# Patient Record
Sex: MALE | Race: WHITE
[De-identification: names, ages, dates, MRNs, and addresses within clinical notes are randomized per-mention and may not be internally consistent; named-entity substitution may affect disease eponyms.]

---

## 2019-06-10 ENCOUNTER — HOSPITAL ENCOUNTER (OUTPATIENT)
Age: 39
Discharge: HOME | End: 2019-06-10
Payer: COMMERCIAL

## 2019-06-10 VITALS — BODY MASS INDEX: 44.9 KG/M2

## 2019-06-10 DIAGNOSIS — I10: Primary | ICD-10-CM

## 2019-06-10 LAB
ALANINE AMINOTRANSFER ALT/SGPT: 60 U/L (ref 16–61)
ALBUMIN SERPL-MCNC: 3.7 G/DL (ref 3.2–5)
ALKALINE PHOSPHATASE: 68 U/L (ref 45–117)
ANION GAP: 5 (ref 5–15)
AST(SGOT): 23 U/L (ref 15–37)
BUN SERPL-MCNC: 13 MG/DL (ref 7–18)
BUN/CREAT RATIO: 13.8 RATIO (ref 10–20)
CALCIUM SERPL-MCNC: 9.1 MG/DL (ref 8.5–10.1)
CARBON DIOXIDE: 29 MMOL/L (ref 21–32)
CHLORIDE: 103 MMOL/L (ref 98–107)
CHOLEST SERPL-MCNC: 167 MG/DL
DEPRECATED RDW RBC: 41.5 FL (ref 35.1–43.9)
DIFFERENTIAL INDICATED: (no result)
ERYTHROCYTE [DISTWIDTH] IN BLOOD: 13.8 % (ref 11.6–14.6)
EST GLOM FILT RATE - AFR AMER: 116 ML/MIN (ref 60–?)
GLOBULIN: 3.9 G/DL (ref 2.2–4.2)
GLUCOSE: 250 MG/DL (ref 74–106)
HCT VFR BLD AUTO: 40.5 % (ref 40–54)
HEMOGLOBIN: 14 G/DL (ref 13–16.5)
HGB BLD-MCNC: 14 G/DL (ref 13–16.5)
IMMATURE GRANULOCYTES COUNT: 0.02 X10^3/UL (ref 0–0)
MCV RBC: 82.7 FL (ref 80–94)
MEAN CORP HGB CONC: 34.6 G/GL (ref 32–36)
MEAN PLATELET VOL.: 10.8 FL (ref 6.2–12)
PLATELET # BLD: 222 K/MM3 (ref 150–450)
PLATELET COUNT: 222 K/MM3 (ref 150–450)
POSITIVE COUNT: NO
POSITIVE DIFFERENTIAL: NO
POSITIVE MORPHOLOGY: NO
POTASSIUM: 4 MMOL/L (ref 3.5–5.1)
RBC # BLD AUTO: 4.9 M/MM3 (ref 4.6–6.2)
RBC DISTRIBUTION WIDTH CV: 13.8 % (ref 11.6–14.6)
RBC DISTRIBUTION WIDTH SD: 41.5 FL (ref 35.1–43.9)
TRIGLYCERIDES: 247 MG/DL
VLDLC SERPL-MCNC: 49 MG/DL (ref 5–40)
WBC # BLD AUTO: 7.9 K/MM3 (ref 4.4–11)
WHITE BLOOD COUNT: 7.9 K/MM3 (ref 4.4–11)

## 2019-06-10 PROCEDURE — 80053 COMPREHEN METABOLIC PANEL: CPT

## 2019-06-10 PROCEDURE — 85025 COMPLETE CBC W/AUTO DIFF WBC: CPT

## 2019-06-10 PROCEDURE — 80061 LIPID PANEL: CPT

## 2020-06-29 ENCOUNTER — HOSPITAL ENCOUNTER (OUTPATIENT)
Age: 40
Discharge: HOME | End: 2020-06-29
Payer: COMMERCIAL

## 2020-06-29 VITALS — BODY MASS INDEX: 49.1 KG/M2

## 2020-06-29 DIAGNOSIS — E11.65: Primary | ICD-10-CM

## 2020-06-29 DIAGNOSIS — I10: ICD-10-CM

## 2020-06-29 LAB
ALANINE AMINOTRANSFER ALT/SGPT: 67 U/L (ref 16–61)
ALBUMIN SERPL-MCNC: 3.9 G/DL (ref 3.2–5)
ALKALINE PHOSPHATASE: 82 U/L (ref 45–117)
ANION GAP: 7 (ref 5–15)
AST(SGOT): 34 U/L (ref 15–37)
BUN SERPL-MCNC: 18 MG/DL (ref 7–18)
BUN/CREAT RATIO: 20.4 RATIO (ref 10–20)
CALCIUM SERPL-MCNC: 9 MG/DL (ref 8.5–10.1)
CARBON DIOXIDE: 28 MMOL/L (ref 21–32)
CHLORIDE: 105 MMOL/L (ref 98–107)
CHOLEST SERPL-MCNC: 204 MG/DL
DEPRECATED RDW RBC: 42.7 FL (ref 35.1–43.9)
ERYTHROCYTE [DISTWIDTH] IN BLOOD: 14.4 % (ref 11.6–14.6)
EST GLOM FILT RATE - AFR AMER: 124 ML/MIN (ref 60–?)
GLOBULIN: 4.3 G/DL (ref 2.2–4.2)
GLUCOSE: 126 MG/DL (ref 74–106)
HCT VFR BLD AUTO: 45.1 % (ref 40–54)
HEMOGLOBIN: 15 G/DL (ref 13–16.5)
HGB BLD-MCNC: 15 G/DL (ref 13–16.5)
IMMATURE GRANULOCYTES COUNT: 0.02 X10^3/UL (ref 0–0)
MCV RBC: 83.1 FL (ref 80–94)
MEAN CORP HGB CONC: 33.3 G/DL (ref 32–36)
MEAN PLATELET VOL.: 10.6 FL (ref 6.2–12)
NRBC FLAGGED BY ANALYZER: 0 % (ref 0–5)
PLATELET # BLD: 263 K/MM3 (ref 150–450)
PLATELET COUNT: 263 K/MM3 (ref 150–450)
POTASSIUM: 3.7 MMOL/L (ref 3.5–5.1)
RBC # BLD AUTO: 5.43 M/MM3 (ref 4.6–6.2)
RBC DISTRIBUTION WIDTH CV: 14.4 % (ref 11.6–14.6)
RBC DISTRIBUTION WIDTH SD: 42.7 FL (ref 35.1–43.9)
TRIGLYCERIDES: 263 MG/DL
VLDLC SERPL-MCNC: 53 MG/DL (ref 5–40)
WBC # BLD AUTO: 7.9 K/MM3 (ref 4.4–11)
WHITE BLOOD COUNT: 7.9 K/MM3 (ref 4.4–11)

## 2020-06-29 PROCEDURE — 85025 COMPLETE CBC W/AUTO DIFF WBC: CPT

## 2020-06-29 PROCEDURE — 80061 LIPID PANEL: CPT

## 2020-06-29 PROCEDURE — 80053 COMPREHEN METABOLIC PANEL: CPT

## 2020-11-12 ENCOUNTER — HOSPITAL ENCOUNTER (OUTPATIENT)
Dept: HOSPITAL 100 - LABSPEC | Age: 40
Discharge: HOME | End: 2020-11-12
Payer: COMMERCIAL

## 2020-11-12 DIAGNOSIS — U07.1: Primary | ICD-10-CM

## 2020-11-12 PROCEDURE — C9803 HOPD COVID-19 SPEC COLLECT: HCPCS

## 2020-11-12 PROCEDURE — 87635 SARS-COV-2 COVID-19 AMP PRB: CPT

## 2020-11-12 PROCEDURE — U0003 INFECTIOUS AGENT DETECTION BY NUCLEIC ACID (DNA OR RNA); SEVERE ACUTE RESPIRATORY SYNDROME CORONAVIRUS 2 (SARS-COV-2) (CORONAVIRUS DISEASE [COVID-19]), AMPLIFIED PROBE TECHNIQUE, MAKING USE OF HIGH THROUGHPUT TECHNOLOGIES AS DESCRIBED BY CMS-2020-01-R: HCPCS

## 2023-03-21 DIAGNOSIS — I10 HYPERTENSION, UNSPECIFIED TYPE: Primary | ICD-10-CM

## 2023-03-21 PROBLEM — E11.9 TYPE 2 DIABETES MELLITUS WITHOUT COMPLICATION (MULTI): Status: ACTIVE | Noted: 2023-03-21

## 2023-03-21 PROBLEM — G47.33 OBSTRUCTIVE SLEEP APNEA SYNDROME: Status: ACTIVE | Noted: 2023-03-21

## 2023-03-21 PROBLEM — F43.20 ADJUSTMENT DISORDER, UNSPECIFIED: Status: ACTIVE | Noted: 2023-03-21

## 2023-03-21 RX ORDER — DILTIAZEM HYDROCHLORIDE EXTENDED-RELEASE TABLETS 240 MG/1
240 TABLET, EXTENDED RELEASE ORAL DAILY
Qty: 30 TABLET | Refills: 2 | Status: SHIPPED | OUTPATIENT
Start: 2023-03-21 | End: 2023-03-23 | Stop reason: CLARIF

## 2023-03-21 RX ORDER — DILTIAZEM HYDROCHLORIDE 240 MG/1
240 CAPSULE, EXTENDED RELEASE ORAL DAILY
COMMUNITY
Start: 2023-02-14 | End: 2023-03-21

## 2023-03-21 RX ORDER — ALBUTEROL SULFATE 90 UG/1
AEROSOL, METERED RESPIRATORY (INHALATION)
COMMUNITY

## 2023-03-21 RX ORDER — METFORMIN HYDROCHLORIDE 1000 MG/1
1000 TABLET ORAL 2 TIMES DAILY
COMMUNITY
End: 2023-04-13 | Stop reason: SDUPTHER

## 2023-03-21 RX ORDER — DILTIAZEM HYDROCHLORIDE EXTENDED-RELEASE TABLETS 240 MG/1
240 TABLET, EXTENDED RELEASE ORAL DAILY
COMMUNITY
Start: 2022-01-06 | End: 2023-03-21 | Stop reason: ALTCHOICE

## 2023-03-21 RX ORDER — METOPROLOL TARTRATE AND HYDROCHLOROTHIAZIDE 100; 25 MG/1; MG/1
1 TABLET ORAL DAILY
Qty: 90 TABLET | Refills: 0 | Status: SHIPPED | OUTPATIENT
Start: 2023-03-21 | End: 2023-05-18 | Stop reason: SDUPTHER

## 2023-03-21 RX ORDER — DULAGLUTIDE 1.5 MG/.5ML
INJECTION, SOLUTION SUBCUTANEOUS
COMMUNITY
End: 2023-05-18

## 2023-03-21 RX ORDER — METOPROLOL TARTRATE AND HYDROCHLOROTHIAZIDE 100; 25 MG/1; MG/1
1 TABLET ORAL DAILY
COMMUNITY
End: 2023-03-21 | Stop reason: SDUPTHER

## 2023-03-21 RX ORDER — METOPROLOL SUCCINATE 50 MG/1
TABLET, EXTENDED RELEASE ORAL
COMMUNITY
Start: 2022-04-29 | End: 2023-05-18 | Stop reason: ALTCHOICE

## 2023-03-21 RX ORDER — DULOXETIN HYDROCHLORIDE 60 MG/1
60 CAPSULE, DELAYED RELEASE ORAL DAILY
COMMUNITY
End: 2023-03-27 | Stop reason: SDUPTHER

## 2023-03-22 DIAGNOSIS — I10 HYPERTENSION, UNSPECIFIED TYPE: Primary | ICD-10-CM

## 2023-03-22 DIAGNOSIS — F43.20 ADJUSTMENT DISORDER, UNSPECIFIED TYPE: ICD-10-CM

## 2023-03-23 RX ORDER — DILTIAZEM HYDROCHLORIDE 240 MG/1
240 CAPSULE, COATED, EXTENDED RELEASE ORAL DAILY
Qty: 90 CAPSULE | Refills: 3 | Status: SHIPPED | OUTPATIENT
Start: 2023-03-23 | End: 2023-05-18 | Stop reason: SDUPTHER

## 2023-03-23 NOTE — TELEPHONE ENCOUNTER
Reviewed denial -- tablet form  not covered. I changed this to Capsules.      CALL pt, let him know of the change.   If he still does not get it covered/ approved,  please ask him to let us know.

## 2023-03-23 NOTE — TELEPHONE ENCOUNTER
Reviewed denial tabs not covered. I changed this to Capsules.     CALL pt, let him know of the change.   If he still does not get it covered/ approved,  please ask him to let us know.

## 2023-03-23 NOTE — TELEPHONE ENCOUNTER
"Rx Refill Request Telephone Encounter    Name:  Ramirez Rodriguez  :  [id number]  Medication Name:  ***  {Dose (Optional):52589::\"***\"}  {Route (Optional):13794::\"***\"}  {Frequency (Optional):90482::\"***\"}  {Quantity (Optional):62934::\"***\"}  {Directions (Optional):92076::\"***\"}  Specific Pharmacy location:  ***  Date of last appointment:  ***  Date of next appointment:  ***  Best number to reach patient:  ***          Result Communication    No results found from the In Basket message.    7:26 AM      Results {WERE / WERE NOT:25241} successfully communicated with the {RHEUM PARENT/PATIENT:41157} and they {AMB Acknowledged/Did Not Acknowledge:49225} their understanding.    "

## 2023-03-27 RX ORDER — DULOXETIN HYDROCHLORIDE 60 MG/1
60 CAPSULE, DELAYED RELEASE ORAL DAILY
Qty: 90 CAPSULE | Refills: 1 | Status: SHIPPED | OUTPATIENT
Start: 2023-03-27 | End: 2023-05-18 | Stop reason: SDUPTHER

## 2023-04-07 DIAGNOSIS — M25.551 PAIN OF RIGHT HIP: ICD-10-CM

## 2023-04-07 RX ORDER — MELOXICAM 15 MG/1
15 TABLET ORAL DAILY
Qty: 30 TABLET | Refills: 1 | Status: SHIPPED | OUTPATIENT
Start: 2023-04-07 | End: 2023-04-07 | Stop reason: SDUPTHER

## 2023-04-07 RX ORDER — MELOXICAM 15 MG/1
15 TABLET ORAL DAILY
Qty: 30 TABLET | Refills: 1 | Status: SHIPPED | OUTPATIENT
Start: 2023-04-07 | End: 2023-05-18 | Stop reason: SDUPTHER

## 2023-04-07 RX ORDER — MELOXICAM 15 MG/1
15 TABLET ORAL DAILY
COMMUNITY
End: 2023-04-07 | Stop reason: SDUPTHER

## 2023-04-13 DIAGNOSIS — E11.9 TYPE 2 DIABETES MELLITUS WITHOUT COMPLICATION, WITHOUT LONG-TERM CURRENT USE OF INSULIN (MULTI): Primary | ICD-10-CM

## 2023-04-13 RX ORDER — METFORMIN HYDROCHLORIDE 1000 MG/1
1000 TABLET ORAL 2 TIMES DAILY
Qty: 60 TABLET | Refills: 0 | Status: SHIPPED | OUTPATIENT
Start: 2023-04-13 | End: 2023-05-18 | Stop reason: SDUPTHER

## 2023-05-12 LAB
CHOLESTEROL (MG/DL) IN SER/PLAS: 188 MG/DL (ref 0–199)
CHOLESTEROL IN HDL (MG/DL) IN SER/PLAS: 40.6 MG/DL
CHOLESTEROL/HDL RATIO: 4.6
ESTIMATED AVERAGE GLUCOSE FOR HBA1C: 174 MG/DL
HEMOGLOBIN A1C/HEMOGLOBIN TOTAL IN BLOOD: 7.7 %
LDL: 112 MG/DL (ref 0–99)
TRIGLYCERIDE (MG/DL) IN SER/PLAS: 179 MG/DL (ref 0–149)
VLDL: 36 MG/DL (ref 0–40)

## 2023-05-16 PROBLEM — S05.02XA LEFT CORNEAL ABRASION: Status: ACTIVE | Noted: 2023-05-16

## 2023-05-16 PROBLEM — M25.551 HIP PAIN, RIGHT: Status: ACTIVE | Noted: 2023-05-16

## 2023-05-16 PROBLEM — L60.0 INGROWN TOENAIL WITH INFECTION: Status: ACTIVE | Noted: 2023-05-16

## 2023-05-16 PROBLEM — R06.02 SHORTNESS OF BREATH ON EXERTION: Status: ACTIVE | Noted: 2023-05-16

## 2023-05-16 PROBLEM — H61.22 IMPACTED CERUMEN OF LEFT EAR: Status: ACTIVE | Noted: 2023-05-16

## 2023-05-16 PROBLEM — R05.9 COUGH IN ADULT: Status: ACTIVE | Noted: 2023-05-16

## 2023-05-17 PROBLEM — E78.5 HYPERLIPIDEMIA, MILD: Status: ACTIVE | Noted: 2023-05-17

## 2023-05-17 PROBLEM — H61.22 IMPACTED CERUMEN OF LEFT EAR: Status: RESOLVED | Noted: 2023-05-16 | Resolved: 2023-05-17

## 2023-05-17 PROBLEM — L60.0 INGROWN TOENAIL WITH INFECTION: Status: RESOLVED | Noted: 2023-05-16 | Resolved: 2023-05-17

## 2023-05-17 PROBLEM — E11.9 TYPE 2 DIABETES MELLITUS WITHOUT COMPLICATION (MULTI): Status: RESOLVED | Noted: 2023-03-21 | Resolved: 2023-05-17

## 2023-05-17 PROBLEM — S05.02XA LEFT CORNEAL ABRASION: Status: RESOLVED | Noted: 2023-05-16 | Resolved: 2023-05-17

## 2023-05-17 PROBLEM — E11.65 TYPE 2 DIABETES MELLITUS WITH HYPERGLYCEMIA (MULTI): Status: ACTIVE | Noted: 2023-05-17

## 2023-05-18 ENCOUNTER — TELEPHONE (OUTPATIENT)
Dept: PRIMARY CARE | Facility: CLINIC | Age: 43
End: 2023-05-18

## 2023-05-18 ENCOUNTER — OFFICE VISIT (OUTPATIENT)
Dept: PRIMARY CARE | Facility: CLINIC | Age: 43
End: 2023-05-18
Payer: COMMERCIAL

## 2023-05-18 VITALS
OXYGEN SATURATION: 97 % | DIASTOLIC BLOOD PRESSURE: 76 MMHG | WEIGHT: 315 LBS | TEMPERATURE: 98.6 F | HEART RATE: 78 BPM | HEIGHT: 74 IN | BODY MASS INDEX: 40.43 KG/M2 | SYSTOLIC BLOOD PRESSURE: 154 MMHG

## 2023-05-18 DIAGNOSIS — F43.20 ADJUSTMENT DISORDER, UNSPECIFIED TYPE: ICD-10-CM

## 2023-05-18 DIAGNOSIS — G47.33 OSA TREATED WITH BIPAP: ICD-10-CM

## 2023-05-18 DIAGNOSIS — I10 HYPERTENSION, UNSPECIFIED TYPE: ICD-10-CM

## 2023-05-18 DIAGNOSIS — E78.5 HYPERLIPIDEMIA, MILD: ICD-10-CM

## 2023-05-18 DIAGNOSIS — E66.01 CLASS 3 SEVERE OBESITY WITH SERIOUS COMORBIDITY AND BODY MASS INDEX (BMI) OF 50.0 TO 59.9 IN ADULT, UNSPECIFIED OBESITY TYPE (MULTI): ICD-10-CM

## 2023-05-18 DIAGNOSIS — E11.9 TYPE 2 DIABETES MELLITUS WITHOUT COMPLICATION, WITHOUT LONG-TERM CURRENT USE OF INSULIN (MULTI): ICD-10-CM

## 2023-05-18 DIAGNOSIS — M25.551 HIP PAIN, RIGHT: ICD-10-CM

## 2023-05-18 DIAGNOSIS — E11.65 TYPE 2 DIABETES MELLITUS WITH HYPERGLYCEMIA, WITHOUT LONG-TERM CURRENT USE OF INSULIN (MULTI): Primary | ICD-10-CM

## 2023-05-18 DIAGNOSIS — M25.551 PAIN OF RIGHT HIP: ICD-10-CM

## 2023-05-18 PROCEDURE — 99214 OFFICE O/P EST MOD 30 MIN: CPT | Performed by: FAMILY MEDICINE

## 2023-05-18 PROCEDURE — 3077F SYST BP >= 140 MM HG: CPT | Performed by: FAMILY MEDICINE

## 2023-05-18 PROCEDURE — 3078F DIAST BP <80 MM HG: CPT | Performed by: FAMILY MEDICINE

## 2023-05-18 PROCEDURE — 1036F TOBACCO NON-USER: CPT | Performed by: FAMILY MEDICINE

## 2023-05-18 PROCEDURE — 3008F BODY MASS INDEX DOCD: CPT | Performed by: FAMILY MEDICINE

## 2023-05-18 PROCEDURE — 3051F HG A1C>EQUAL 7.0%<8.0%: CPT | Performed by: FAMILY MEDICINE

## 2023-05-18 RX ORDER — METOPROLOL TARTRATE AND HYDROCHLOROTHIAZIDE 100; 25 MG/1; MG/1
1 TABLET ORAL DAILY
Qty: 90 TABLET | Refills: 0 | Status: SHIPPED | OUTPATIENT
Start: 2023-05-18 | End: 2023-10-26

## 2023-05-18 RX ORDER — METFORMIN HYDROCHLORIDE 1000 MG/1
1000 TABLET ORAL 2 TIMES DAILY
Qty: 60 TABLET | Refills: 0 | Status: SHIPPED | OUTPATIENT
Start: 2023-05-18 | End: 2023-06-30

## 2023-05-18 RX ORDER — DILTIAZEM HYDROCHLORIDE 240 MG/1
240 CAPSULE, COATED, EXTENDED RELEASE ORAL DAILY
Qty: 90 CAPSULE | Refills: 3 | Status: SHIPPED | OUTPATIENT
Start: 2023-05-18 | End: 2024-04-23

## 2023-05-18 RX ORDER — MELOXICAM 15 MG/1
15 TABLET ORAL DAILY
Qty: 30 TABLET | Refills: 1 | Status: SHIPPED | OUTPATIENT
Start: 2023-05-18 | End: 2023-09-20 | Stop reason: SDUPTHER

## 2023-05-18 RX ORDER — DULOXETIN HYDROCHLORIDE 60 MG/1
60 CAPSULE, DELAYED RELEASE ORAL DAILY
Qty: 90 CAPSULE | Refills: 1 | Status: SHIPPED | OUTPATIENT
Start: 2023-05-18 | End: 2024-05-21 | Stop reason: SDUPTHER

## 2023-05-18 NOTE — PROGRESS NOTES
" Subjective   Patient ID: Ramirez Rodriguez is a 42 y.o. male who presents for Follow-up.  HPI Follow Up Visit, past visit note reviewed.  Interval - takes/ tolerates Trulicity .  More adherent to all meds than in the past.  He is due for refills on all meds.     Patient Active Problem List   Diagnosis    Adjustment disorder, unspecified    Hypertension    LYNDSEY treated with BiPAP    Shortness of breath on exertion    Cough in adult    Hip pain, right    Type 2 diabetes mellitus with hyperglycemia (CMS/[facility])    Hyperlipidemia, mild         Review of Systems  Hip pain is controlled with once daily Meloxicam. No episodes of sob w exertion like he had in the past.    Objective   /76 (BP Location: Right arm, Patient Position: Sitting, BP Cuff Size: Large adult)   Pulse 78   Temp 37 °C (98.6 °F) (Temporal)   Ht 1.88 m (6' 2\")   Wt (!) 178 kg (391 lb 6.4 oz)   SpO2 97%   BMI 50.25 kg/m²     Physical Exam  Vitals reviewed.   Constitutional:       General: He is not in acute distress.  Cardiovascular:      Rate and Rhythm: Normal rate and regular rhythm.      Heart sounds: Normal heart sounds.   Pulmonary:      Effort: Pulmonary effort is normal.      Breath sounds: No wheezing or rales.   Neurological:      General: No focal deficit present.      Mental Status: He is alert.         Assessment/Plan           1. Hypertension, unspecified type  Continue current regimen.     - Referral to Comprehensive Weight Loss; Future  - metoprolol ta-hydrochlorothiaz (Lopressor HCT) 100-25 mg tablet; Take 1 tablet by mouth once daily.  Dispense: 90 tablet; Refill: 0  - dilTIAZem CD (Cardizem CD) 240 mg 24 hr capsule; Take 1 capsule (240 mg) by mouth once daily.  Dispense: 90 capsule; Refill: 3    2. Hip pain, right  Stable.  Monitor sxs.  If sxs worsen , low threshold to refer     3. Type 2 diabetes mellitus with hyperglycemia, without long-term current use of insulin (CMS/[facility])  Improved A1c  9 to 7 .    Increase Trulicity  and " recheck in 3 m    - dulaglutide 3 mg/0.5 mL pen injector; Inject 3 mg under the skin 1 (one) time per week.  Dispense: 2 mL; Refill: 3  - Referral to Comprehensive Weight Loss; Future    4. Hyperlipidemia, mild  Will tx with oral med if remains elevated at followup       5. LYNDSEY treated with BiPAP   Continue current regimen.          6. Class 3 severe obesity with serious comorbidity and body mass index (BMI) of 50.0 to 59.9 in adult, unspecified obesity type (CMS/[facility])  Referral to Wt Loss program   - Referral to Comprehensive Weight Loss; Future    7. Type 2 diabetes mellitus without complication, without long-term current use of insulin (CMS/[facility])  Med renewed   - metFORMIN (Glucophage) 1,000 mg tablet; Take 1 tablet (1,000 mg) by mouth 2 times a day.  Dispense: 60 tablet; Refill: 0       8 Adjustment disorder, unspecified type  Med renewed  - DULoxetine (Cymbalta) 60 mg DR capsule; Take 1 capsule (60 mg) by mouth once daily.  Dispense: 90 capsule; Refill: 1        DM2 improved .  Increase Trulicity ,  goal a1c  < 7.0    lipids unchanged. Recheck in 3 mo , and add statin if persists.   Referral for wt management; pt open to all options , including surgery .  Other meds renewed.   BP not at goal yet . Pt will continue to work on monitoring at home.    JESS Tarango MD

## 2023-05-18 NOTE — TELEPHONE ENCOUNTER
Fax sent on 5/18/23 @ 1052am to [phone]. Called [facility] wt management and let them know that the fax was sent, they will watch for fax and will call the pt once they receive.

## 2023-05-18 NOTE — TELEPHONE ENCOUNTER
Referral to weight loss program :      Please fax  to [facility] : [phone]  1.Paper order form pt completed and I completed  2. Electronic order printed from Epic  3. Today's visit note  CALL to initiate the process:   [phone] .   Let them know we are referring a new patient .     Thx

## 2023-06-29 DIAGNOSIS — E11.9 TYPE 2 DIABETES MELLITUS WITHOUT COMPLICATION, WITHOUT LONG-TERM CURRENT USE OF INSULIN (MULTI): ICD-10-CM

## 2023-06-30 RX ORDER — METFORMIN HYDROCHLORIDE 1000 MG/1
1000 TABLET ORAL 2 TIMES DAILY
Qty: 60 TABLET | Refills: 1 | Status: SHIPPED | OUTPATIENT
Start: 2023-06-30 | End: 2023-09-29 | Stop reason: SDUPTHER

## 2023-09-19 ENCOUNTER — LAB (OUTPATIENT)
Dept: LAB | Facility: LAB | Age: 43
End: 2023-09-19
Payer: COMMERCIAL

## 2023-09-19 DIAGNOSIS — E11.65 TYPE 2 DIABETES MELLITUS WITH HYPERGLYCEMIA, WITHOUT LONG-TERM CURRENT USE OF INSULIN (MULTI): ICD-10-CM

## 2023-09-19 DIAGNOSIS — E78.5 HYPERLIPIDEMIA, MILD: ICD-10-CM

## 2023-09-19 PROCEDURE — 80061 LIPID PANEL: CPT

## 2023-09-19 PROCEDURE — 36415 COLL VENOUS BLD VENIPUNCTURE: CPT

## 2023-09-19 PROCEDURE — 83036 HEMOGLOBIN GLYCOSYLATED A1C: CPT

## 2023-09-19 PROCEDURE — 80053 COMPREHEN METABOLIC PANEL: CPT

## 2023-09-20 ENCOUNTER — OFFICE VISIT (OUTPATIENT)
Dept: PRIMARY CARE | Facility: CLINIC | Age: 43
End: 2023-09-20
Payer: COMMERCIAL

## 2023-09-20 VITALS
BODY MASS INDEX: 49.32 KG/M2 | SYSTOLIC BLOOD PRESSURE: 149 MMHG | WEIGHT: 315 LBS | HEART RATE: 69 BPM | OXYGEN SATURATION: 95 % | DIASTOLIC BLOOD PRESSURE: 81 MMHG | TEMPERATURE: 98.4 F | RESPIRATION RATE: 14 BRPM

## 2023-09-20 DIAGNOSIS — I10 HYPERTENSION, UNSPECIFIED TYPE: ICD-10-CM

## 2023-09-20 DIAGNOSIS — E11.65 TYPE 2 DIABETES MELLITUS WITH HYPERGLYCEMIA, WITHOUT LONG-TERM CURRENT USE OF INSULIN (MULTI): Primary | ICD-10-CM

## 2023-09-20 DIAGNOSIS — M25.551 PAIN OF RIGHT HIP: ICD-10-CM

## 2023-09-20 LAB
ALANINE AMINOTRANSFERASE (SGPT) (U/L) IN SER/PLAS: 32 U/L (ref 10–52)
ALBUMIN (G/DL) IN SER/PLAS: 4.1 G/DL (ref 3.4–5)
ALKALINE PHOSPHATASE (U/L) IN SER/PLAS: 52 U/L (ref 33–120)
ANION GAP IN SER/PLAS: 13 MMOL/L (ref 10–20)
ASPARTATE AMINOTRANSFERASE (SGOT) (U/L) IN SER/PLAS: 20 U/L (ref 9–39)
BILIRUBIN TOTAL (MG/DL) IN SER/PLAS: 0.5 MG/DL (ref 0–1.2)
CALCIUM (MG/DL) IN SER/PLAS: 9.9 MG/DL (ref 8.6–10.6)
CARBON DIOXIDE, TOTAL (MMOL/L) IN SER/PLAS: 32 MMOL/L (ref 21–32)
CHLORIDE (MMOL/L) IN SER/PLAS: 99 MMOL/L (ref 98–107)
CHOLESTEROL (MG/DL) IN SER/PLAS: 195 MG/DL (ref 0–199)
CHOLESTEROL IN HDL (MG/DL) IN SER/PLAS: 39.3 MG/DL
CHOLESTEROL/HDL RATIO: 5
CREATININE (MG/DL) IN SER/PLAS: 0.8 MG/DL (ref 0.5–1.3)
ESTIMATED AVERAGE GLUCOSE FOR HBA1C: 194 MG/DL
GFR MALE: >90 ML/MIN/1.73M2
GLUCOSE (MG/DL) IN SER/PLAS: 217 MG/DL (ref 74–99)
HEMOGLOBIN A1C/HEMOGLOBIN TOTAL IN BLOOD: 8.4 %
LDL: 122 MG/DL (ref 0–99)
POTASSIUM (MMOL/L) IN SER/PLAS: 4.7 MMOL/L (ref 3.5–5.3)
PROTEIN TOTAL: 7.3 G/DL (ref 6.4–8.2)
SODIUM (MMOL/L) IN SER/PLAS: 139 MMOL/L (ref 136–145)
TRIGLYCERIDE (MG/DL) IN SER/PLAS: 167 MG/DL (ref 0–149)
UREA NITROGEN (MG/DL) IN SER/PLAS: 11 MG/DL (ref 6–23)
VLDL: 33 MG/DL (ref 0–40)

## 2023-09-20 PROCEDURE — 99214 OFFICE O/P EST MOD 30 MIN: CPT | Performed by: FAMILY MEDICINE

## 2023-09-20 PROCEDURE — 4010F ACE/ARB THERAPY RXD/TAKEN: CPT | Performed by: FAMILY MEDICINE

## 2023-09-20 PROCEDURE — 1036F TOBACCO NON-USER: CPT | Performed by: FAMILY MEDICINE

## 2023-09-20 PROCEDURE — 3008F BODY MASS INDEX DOCD: CPT | Performed by: FAMILY MEDICINE

## 2023-09-20 PROCEDURE — 3079F DIAST BP 80-89 MM HG: CPT | Performed by: FAMILY MEDICINE

## 2023-09-20 PROCEDURE — 3077F SYST BP >= 140 MM HG: CPT | Performed by: FAMILY MEDICINE

## 2023-09-20 PROCEDURE — 3052F HG A1C>EQUAL 8.0%<EQUAL 9.0%: CPT | Performed by: FAMILY MEDICINE

## 2023-09-20 RX ORDER — LISINOPRIL 20 MG/1
20 TABLET ORAL DAILY
Qty: 30 TABLET | Refills: 5 | Status: SHIPPED | OUTPATIENT
Start: 2023-09-20 | End: 2024-03-18

## 2023-09-20 RX ORDER — GLIPIZIDE 5 MG/1
5 TABLET ORAL
Qty: 60 TABLET | Refills: 5 | Status: SHIPPED | OUTPATIENT
Start: 2023-09-20 | End: 2024-03-18

## 2023-09-20 RX ORDER — MELOXICAM 15 MG/1
15 TABLET ORAL DAILY
Qty: 30 TABLET | Refills: 1 | Status: SHIPPED | OUTPATIENT
Start: 2023-09-20 | End: 2023-12-20 | Stop reason: SDUPTHER

## 2023-09-20 ASSESSMENT — PATIENT HEALTH QUESTIONNAIRE - PHQ9
1. LITTLE INTEREST OR PLEASURE IN DOING THINGS: NOT AT ALL
SUM OF ALL RESPONSES TO PHQ9 QUESTIONS 1 AND 2: 0
2. FEELING DOWN, DEPRESSED OR HOPELESS: NOT AT ALL

## 2023-09-20 NOTE — PROGRESS NOTES
Subjective   Patient ID: Ramirez Rodriguez is a 42 y.o. male who presents for Follow-up (3 mo fuv), Immunizations (Pt declines flu vaccine), Diabetes, Hyperlipidemia, and Hypertension.  HPI  Last visit  5/2023 .  At that time, increased Trulicity .  Tolerating change in dosage.  Adherent to other treatment as well.    Has an upcoming appointment in the [facility] weight loss management program.      Review of Systems  Denies chest pain, shortness of breath.  Objective   /81 (BP Location: Left arm, Patient Position: Sitting, BP Cuff Size: Large adult)   Pulse 69   Temp 36.9 °C (98.4 °F)   Resp 14   Wt (!) 174 kg (384 lb 1.6 oz)   SpO2 95%   BMI 49.32 kg/m²     Physical Exam  Vitals reviewed.   Constitutional:       General: He is not in acute distress.  Cardiovascular:      Rate and Rhythm: Normal rate and regular rhythm.      Heart sounds: Normal heart sounds.   Pulmonary:      Effort: Pulmonary effort is normal.      Breath sounds: No wheezing or rales.   Neurological:      General: No focal deficit present.      Mental Status: He is alert.         Wt Readings from Last 3 Encounters:   09/20/23 (!) 174 kg (384 lb 1.6 oz)   05/18/23 (!) 178 kg (391 lb 6.4 oz)   02/17/23 (!) 173 kg (382 lb 6 oz)     BP Readings from Last 3 Encounters:   09/20/23 149/81   05/18/23 154/76   02/17/23 150/84          Assessment/Plan   Problem List Items Addressed This Visit          Medium    Hypertension    Relevant Orders    Lipid Panel    Comprehensive Metabolic Panel    Type 2 diabetes mellitus with hyperglycemia (CMS/[facility]) - Primary    Relevant Medications    lisinopril 20 mg tablet    glipiZIDE (Glucotrol) 5 mg tablet    Other Relevant Orders    Hemoglobin A1C    Lipid Panel    Comprehensive Metabolic Panel     Other Visit Diagnoses       Pain of right hip        Relevant Medications    meloxicam (Mobic) 15 mg tablet            Diabetes type 2 with hyperglycemia: Add Glucotrol continue metformin and Trulicity.  Referral to  endocrinology if not improving  Hypertension uncontrolled: Add lisinopril 20 mg    Hip pain: Mobic renewed  3month followup w labwork.   JESS Tarango MD

## 2023-09-29 DIAGNOSIS — E11.9 TYPE 2 DIABETES MELLITUS WITHOUT COMPLICATION, WITHOUT LONG-TERM CURRENT USE OF INSULIN (MULTI): ICD-10-CM

## 2023-09-29 RX ORDER — METFORMIN HYDROCHLORIDE 1000 MG/1
1000 TABLET ORAL 2 TIMES DAILY
Qty: 60 TABLET | Refills: 2 | Status: SHIPPED | OUTPATIENT
Start: 2023-09-29 | End: 2023-12-20 | Stop reason: SDUPTHER

## 2023-10-26 DIAGNOSIS — I10 HYPERTENSION, UNSPECIFIED TYPE: ICD-10-CM

## 2023-10-26 DIAGNOSIS — E11.65 TYPE 2 DIABETES MELLITUS WITH HYPERGLYCEMIA, WITHOUT LONG-TERM CURRENT USE OF INSULIN (MULTI): ICD-10-CM

## 2023-10-26 RX ORDER — DULAGLUTIDE 3 MG/.5ML
3 INJECTION, SOLUTION SUBCUTANEOUS
Qty: 2 ML | Refills: 1 | Status: SHIPPED | OUTPATIENT
Start: 2023-10-26 | End: 2023-12-20 | Stop reason: SDUPTHER

## 2023-10-26 RX ORDER — METOPROLOL TARTRATE AND HYDROCHLOROTHIAZIDE 100; 25 MG/1; MG/1
1 TABLET ORAL DAILY
Qty: 90 TABLET | Refills: 0 | Status: SHIPPED | OUTPATIENT
Start: 2023-10-26 | End: 2024-01-30

## 2023-10-30 PROBLEM — E66.01 OBESITY, MORBID, BMI 40.0-49.9 (MULTI): Status: ACTIVE | Noted: 2023-10-30

## 2023-10-30 NOTE — PROGRESS NOTES
Subjective     Date: 11/2/2023 Time: 2:24 PM  Name: Ramirez Rodriguez  MRN: [id number]    This is a 42 y.o. male with morbid obesity (Body mass index is 48.93 kg/m².) who presents to clinic for consideration of bariatric surgery. he has attempted and failed multiple diet and exercise regimens for weight loss. Initial Onset of obesity was in childhood.  Their goal for surgery is to  be healthier  and lose weight. The patient has tried multiple diets to lose weight including Weight Watchers. The patient was most successful with the Weight Watchers . The most pounds lost on this diet were 25 lbs. The patient considers their dietary weakness to be  chocolates and pop  The patient reports a  highest weight ever of 399 pounds and lowest weight ever of 376 pounds Distribution of Obesity: is central. The patient does not exercise   Comorbidities: high cholesterol, diabetes managed by oral medication , sleep apnea using an appliance, and hypertension controlled with oral meds    Naomie-en-Y Gastric Bypass    1 = Symptoms noticeable but not bothersome    PMH:   Past Medical History:   Diagnosis Date    Diabetes mellitus (CMS/HCC)     HTN (hypertension)     Hyperlipidemia     Morbid obesity (CMS/HCC)     Morbid obesity (CMS/HCC)     Pulmonary arterial hypertension (CMS/HCC)     Severe obstructive sleep apnea         PSH:   Past Surgical History:   Procedure Laterality Date    SHOULDER Right 2012          Denies personal hx of VTE.  + family hx of VTE (Mother)    FAMILY HISTORY:  Family History   Problem Relation Name Age of Onset    Heart disease Mother Haritha     Stroke Mother Haritha     Hypertension Mother Haritha     Alcohol abuse Other      Stroke Other      Diabetes Other Grandfather         Does not specify which one    Diabetes Paternal Grandfather Ramirez         SOCIAL HISTORY:  Social History     Tobacco Use    Smoking status: Never    Smokeless tobacco: Never   Substance Use Topics    Alcohol use: Not Currently     Comment: Socially     Drug use: Never       MEDICATIONS:  Prior to Admission Medications:  Medication Documentation Review Audit       Reviewed by Arelis Munoz CMA (Medical Assistant) on 11/02/23 at 1415      Medication Order Taking? Sig Documenting Provider Last Dose Status   albuterol 90 mcg/actuation inhaler [id number] Yes INHALE 2 PUFFS BY MOUTH PRIOR TO EXERTION/ EXERCISE AND EVERY 4 TO 6 HOURS AS NEEDED FOR COUGH OR SHORTNESS OF BREATH Historical Provider, MD Taking Active   dilTIAZem CD (Cardizem CD) 240 mg 24 hr capsule [id number] Yes Take 1 capsule (240 mg) by mouth once daily. Jael Tarango MD Taking Active   DULoxetine (Cymbalta) 60 mg DR capsule [id number] Yes Take 1 capsule (60 mg) by mouth once daily. Jael Tarango MD Taking Active   glipiZIDE (Glucotrol) 5 mg tablet [id number] Yes Take 1 tablet (5 mg) by mouth 2 times a day before meals. Jael Tarango MD Taking Active   lisinopril 20 mg tablet [id number] Yes Take 1 tablet (20 mg) by mouth once daily. Jael Tarango MD Taking Active   meloxicam (Mobic) 15 mg tablet [id number] Yes Take 1 tablet (15 mg) by mouth once daily. Jael Tarango MD Taking Active   metFORMIN (Glucophage) 1,000 mg tablet [id number] Yes Take 1 tablet (1,000 mg) by mouth 2 times a day. Jael Tarango MD Taking Active   metoprolol ta-hydrochlorothiaz (Lopressor HCT) 100-25 mg tablet [id number] Yes TAKE 1 TABLET BY MOUTH ONCE DAILY Jael Tarango MD Taking Active   Trulicity 3 mg/0.5 mL pen injector [id number] Yes INJECT 3mg SUBCUTANEOUSLY EVERY WEEK Jael Tarango MD Taking Active                     ALLERGIES:  Allergies   Allergen Reactions    Penicillins Unknown       REVIEW OF SYSTEMS:  GENERAL: Negative for malaise, significant weight loss and fever  HEAD: Negative for headache, swelling.  NECK: Negative for lumps, goiter, pain and significant neck swelling  RESPIRATORY: Negative for cough, wheezing or shortness of breath.  CARDIOVASCULAR: Negative for chest pain, leg swelling or palpitations.  GI: Negative for abdominal  "discomfort, blood in stools or black stools or change in bowel habits  : No history of dysuria, frequency or incontinence  MUSCULOSKELETAL: Negative for joint pain or swelling, back pain or muscle pain.  SKIN: Negative for lesions, rash, and itching.  PSYCH: Negative for sleep disturbance, mood disorder and recent psychosocial stressors.  ENDOCRINE: Negative for cold or heat intolerance, polyuria, polydipsia and goiter.    Objective   PHYSICAL EXAM:  Visit Vitals  /84 (BP Location: Right arm, Patient Position: Sitting, BP Cuff Size: Large adult long)   Pulse 62   Ht 1.867 m (6' 1.5\")   Wt (!) 171 kg (376 lb)   SpO2 98%   BMI 48.93 kg/m²   Smoking Status Never   BSA 2.98 m²     General appearance: obese, NAD  Neuro: AOx3  Head: EOMI; no swelling or lesions of scalp or face  ENT:  no lumps or lymphadenopathy, thyroid normal to palpation; oropharynx clear, no swelling or erythema  Skin: warm, no erythema or rashes  Lungs: clear to percussion and auscultation  Heart: regular rhythm and S1, S2 normal  Abdomen: soft, non-tender, no masses, no organomegaly  Extremities: Normal exam of the extremities. No swelling or pain.  Psych: no hurried speech, no flight of ideas, normal affect    Assessment/Plan   IMPRESSION:  Ramirez Rodriguez is a 42 y.o. male with a BMI of Body mass index is 48.93 kg/m². with the following diagnoses and co-morbidities:     Past Medical History:   Diagnosis Date    Diabetes mellitus (CMS/HCC)     HTN (hypertension)     Hyperlipidemia     Morbid obesity (CMS/HCC)     Morbid obesity (CMS/HCC)     Pulmonary arterial hypertension (CMS/HCC)     Severe obstructive sleep apnea        This patient does meet the criteria for a surgical weight loss procedure according to NIH guidelines.  The risks of sleeve gastrectomy, Naomie-en-Y gastric bypass,  surgery including but not limited to bleeding, leak along staple lines, infection, dehydration, ulcers, internal hernia, DVT/PE, pneumonia, myocardial " infarction, prolonged nausea/vomiting, incomplete resolution of associated medical conditions, reflux, weight regain, vitamin/mineral deficiencies, and death have been explained to the patient and Ramirez Rodriguez has expressed understanding and acceptance of them.       He wants to proceed with GBP.       We discussed the lifestyle changes necessary to be successful following surgery.    The increased risk of substance and alcohol abuse following bariatric surgery was discussed with the patient, along with the negative consequences of substance/alcohol use after surgery including addiction, worsening of mental health disorders, and injury to the stomach. The risk of smoking and vaping (tobacco or any other substance) after bariatric surgery was explained to the patient. This includes risk of anastamotic ulcers, gastritis, bleeding, perforation, stricture, and PO intolerance.  The patient expressed understanding and acceptance of these risks.      The benefits of the above surgeries including weight loss, improvement/resolution of associated medical and mental health conditions, improved mobility, and decreased mortality have been explained the the patient and Raimrez Rodriguez has expressed understanding and acceptance of them.      PLAN:  The plan of treatment for Ramirez Rodriguez is to continue with the consultations and tests ordered today in hopes of qualifying for pre-operative clearance for bariatric surgery. This includes:    Consult Nutrition for education   Consult Psychology  Consult Cardiology  Consult Pulmonology  Labs ordered  EGD  PCP for medical optimization  Consult sleep medicine - concern for LYNDSEY  Recommend at least 15 lbs of weight loss prior to surgery.
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
## 2023-11-02 ENCOUNTER — OFFICE VISIT (OUTPATIENT)
Dept: SURGERY | Facility: CLINIC | Age: 43
End: 2023-11-02
Payer: COMMERCIAL

## 2023-11-02 ENCOUNTER — NUTRITION (OUTPATIENT)
Dept: SURGERY | Facility: CLINIC | Age: 43
End: 2023-11-02
Payer: COMMERCIAL

## 2023-11-02 VITALS
OXYGEN SATURATION: 98 % | WEIGHT: 315 LBS | SYSTOLIC BLOOD PRESSURE: 129 MMHG | HEIGHT: 74 IN | DIASTOLIC BLOOD PRESSURE: 84 MMHG | HEART RATE: 62 BPM | BODY MASS INDEX: 40.43 KG/M2

## 2023-11-02 DIAGNOSIS — I10 HYPERTENSION, UNSPECIFIED TYPE: ICD-10-CM

## 2023-11-02 DIAGNOSIS — E11.65 TYPE 2 DIABETES MELLITUS WITH HYPERGLYCEMIA, WITHOUT LONG-TERM CURRENT USE OF INSULIN (MULTI): ICD-10-CM

## 2023-11-02 DIAGNOSIS — G47.33 OSA TREATED WITH BIPAP: ICD-10-CM

## 2023-11-02 DIAGNOSIS — Z98.84 BARIATRIC SURGERY STATUS: ICD-10-CM

## 2023-11-02 DIAGNOSIS — R06.02 SHORTNESS OF BREATH ON EXERTION: ICD-10-CM

## 2023-11-02 DIAGNOSIS — E78.5 HYPERLIPIDEMIA, MILD: ICD-10-CM

## 2023-11-02 DIAGNOSIS — E66.01 OBESITY, MORBID, BMI 40.0-49.9 (MULTI): Primary | ICD-10-CM

## 2023-11-02 PROCEDURE — 1036F TOBACCO NON-USER: CPT | Performed by: SURGERY

## 2023-11-02 PROCEDURE — 3079F DIAST BP 80-89 MM HG: CPT | Performed by: SURGERY

## 2023-11-02 PROCEDURE — 4010F ACE/ARB THERAPY RXD/TAKEN: CPT | Performed by: SURGERY

## 2023-11-02 PROCEDURE — 99205 OFFICE O/P NEW HI 60 MIN: CPT | Performed by: SURGERY

## 2023-11-02 PROCEDURE — 3074F SYST BP LT 130 MM HG: CPT | Performed by: SURGERY

## 2023-11-02 PROCEDURE — 3008F BODY MASS INDEX DOCD: CPT | Performed by: SURGERY

## 2023-11-02 PROCEDURE — 3052F HG A1C>EQUAL 8.0%<EQUAL 9.0%: CPT | Performed by: SURGERY

## 2023-11-02 PROCEDURE — 99215 OFFICE O/P EST HI 40 MIN: CPT | Performed by: SURGERY

## 2023-11-02 NOTE — PROGRESS NOTES
Surgeon: Liam  Patient is considering:  Gastric bypass    ASSESSMENT:  Current weight: 376.0  Ht:  73.5 in   BMI:  48.9     Initial start weight:   376.0  Pre-Op Excess Body Weight (EBW):      Target Post-Op weight goal:         Food allergies/intolerances:   no  Chewing/Swallowing/Dentition:   no  Nausea / Vomiting / Hx Gastroparesis:  no  Diarrhea/ Constipation:   no  Exercise level: none  Smoking/Tobacco use:  no  Vitamins/Minerals supplements:  none  Medications:   see list  Past diet attempts:  WW, Atkins  Hours of sleep/night:    6-7    24 HOUR RECALL/DIET HISTORY:  Breakfast:   1 c coffee  Snack:    4 cookies  Lunch:   none  Snack:   none  Dinner:   Mexican; 2 burritos  Snack:   none  Beverages:   1-2 16 oz bottles reg soda/day, 1 c coffee (mostly creamer) >64 oz water/day  Alcohol:  occasionally; drinks beer    Person responsible for cooking & shopping? Pt shops and both him and wife cook  How often do you eat sweet snacks?  Daily- candy  How often do you eat savory snacks?  1-2x/day- chips  How often do you eat out?    1-2x/week  Do you feel overly stuffed?  Yes, all the time  Binge Eating?   no  Night Eating?    no  Emotional Eating?  no        READINESS TO LEARN:  Motivation to learn: Interested        Understanding of instruction: Good   Anticipated Compliance: Good    Family Support: Pt's wife present and supportive      Educational Materials Provided:    Pre-op Diet and sample menus                                             Nutrition Guidelines for Gastric Bypass and Sleeve Gastrectomy   Schedules for [facility] class and support group   1700 Calorie meal plan   Goals sheet    Nutrition assessment completed today.  Pt will be scheduled for a video education class at a later date to discuss the 2 week pre op diet, post op protein and fluid goals, vitamin and mineral supplementation, exercise goals, and post op diet progression.     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      Pt has had an issue with weight since childhood.  His dietary weaknesses are candy, chips, pop.  He tends to skip meals.      REcommend following  [id number]  calorie meal plan.  Recommend eating 3 meals that include 3-4 oz protein and 1-2 high protein snacks if needed.  Advised to stop drinking soda and wean off of caffeine.  Recommend packing meals and snacks for work.  Discussed meal and snack ideas.  Reviewed the postop behaviors to start practicing.  Set a goal to exercise at the gym 3x/week for at least 30 min.     Patient was receptive to nutritional recommendations, asked numerous questions, and verbalized understanding of the weight loss surgery diet.  Patient expressed understanding about the importance of strict dietary compliance post-surgery to avoid nutritional deficiencies and achieve optimal weight loss and verbalized intent to follow dietary recommendations.    Malnutrition Screening:  Significant unintentional weight loss? n/a   Eating less than 75% of usual intake for more than 2 weeks? n/a      Nutrition Diagnosis:   1. Overweight/obesity related to excess energy intake as evidenced by BMI = 40 kg/m^2.  2. Food- and nutrition-related knowledge deficit related to lack of prior exposure to surgical weight loss information as evidenced by pt new to surgical program.    Nutrition Interventions:   1. Modify type and amount of food and nutrients within meals and snacks.  2. Comprehensive Nutrition Education    Recommendations:  1. Begin following your [id number] calorie  meal plan.  Measure and record intake daily.   2. Structure meal patterns, eating three meals and 1-2 snacks per day.  3. Aim for 3-4 oz protein per meal.  Have 1-2 high protein snacks that are 10-20 g protein each.  You can try a tuna or chicken packet, Greek yogurt, 2 string cheeses, Protein bars like Quest, Pure Protein, Premier, or Built Bars. you can also try protein chips form Quest or Atkins.     4. Drink 64oz of calorie-free, caffeine-free, and non-carbonated beverages. Stop drinking pop and wean off of caffeine.   5. Practice no drinking 30 minutes before meals, nothing with meals and wait 30 minutes after meals to drink again. Make meals last 30 minutes-chew thoroughly.   6. Limit or omit eating out/sweets/savory snacks to 1-2 times per week.  7. Begin daily multivitamin.  Flintstones Complete has everything you need. You can also try regular Centrum.  No gummies.   8. Go to the gym for 30 min 3x/week.    Pre-op Goal weight: lose 5% of body weight    Nutrition Monitoring and Evaluation: 1-2 pound weight loss per week  Criteria: weight check  Need for Follow-up:     Patient does meet National Institutes Health guidelines for weight loss surgery, however needs to demonstrate consistent effort in making dietary changes before giving clearance. It is anticipated that the patient will need at least 1-2 nutritional follow-up visits prior to clearance for surgery.

## 2023-11-28 ENCOUNTER — OFFICE VISIT (OUTPATIENT)
Dept: CARDIOLOGY | Facility: CLINIC | Age: 43
End: 2023-11-28
Payer: COMMERCIAL

## 2023-11-28 VITALS
DIASTOLIC BLOOD PRESSURE: 74 MMHG | BODY MASS INDEX: 49.85 KG/M2 | SYSTOLIC BLOOD PRESSURE: 132 MMHG | RESPIRATION RATE: 16 BRPM | WEIGHT: 315 LBS | HEART RATE: 72 BPM | OXYGEN SATURATION: 98 %

## 2023-11-28 DIAGNOSIS — G47.33 OSA TREATED WITH BIPAP: ICD-10-CM

## 2023-11-28 DIAGNOSIS — Z98.84 BARIATRIC SURGERY STATUS: ICD-10-CM

## 2023-11-28 DIAGNOSIS — Z01.818 PRE-OP EVALUATION: ICD-10-CM

## 2023-11-28 DIAGNOSIS — I10 HYPERTENSION, UNSPECIFIED TYPE: ICD-10-CM

## 2023-11-28 DIAGNOSIS — Z01.810 PREOP CARDIOVASCULAR EXAM: Primary | ICD-10-CM

## 2023-11-28 DIAGNOSIS — R94.31 ABNORMAL EKG: ICD-10-CM

## 2023-11-28 PROCEDURE — 99204 OFFICE O/P NEW MOD 45 MIN: CPT | Performed by: INTERNAL MEDICINE

## 2023-11-28 PROCEDURE — 4010F ACE/ARB THERAPY RXD/TAKEN: CPT | Performed by: INTERNAL MEDICINE

## 2023-11-28 PROCEDURE — 3075F SYST BP GE 130 - 139MM HG: CPT | Performed by: INTERNAL MEDICINE

## 2023-11-28 PROCEDURE — 3008F BODY MASS INDEX DOCD: CPT | Performed by: INTERNAL MEDICINE

## 2023-11-28 PROCEDURE — 3052F HG A1C>EQUAL 8.0%<EQUAL 9.0%: CPT | Performed by: INTERNAL MEDICINE

## 2023-11-28 PROCEDURE — 3078F DIAST BP <80 MM HG: CPT | Performed by: INTERNAL MEDICINE

## 2023-11-28 PROCEDURE — 93000 ELECTROCARDIOGRAM COMPLETE: CPT | Performed by: INTERNAL MEDICINE

## 2023-11-28 PROCEDURE — 1036F TOBACCO NON-USER: CPT | Performed by: INTERNAL MEDICINE

## 2023-11-28 NOTE — PROGRESS NOTES
Referred by Dr. Becerril for Pre-op Clearance (Bariatric surgery)     History Of Present Illness:    Ramirez Rodriguez is a 42 y.o. male  with DM2/HTN/obesity /abnormal EKG presenting for pre op prior to gastric bypass.  Had stress test and echo 4/2022 because of abnormal EKG    Patient denies chest pain/SOB/palpitations/dizziness/lightheadedness/edema/claudication    Activity limited        Past Medical History:  He has a past medical history of Diabetes mellitus (CMS/HCC), HTN (hypertension), Hyperlipidemia, Morbid obesity (CMS/HCC), Morbid obesity (CMS/HCC), Pulmonary arterial hypertension (CMS/HCC), and Severe obstructive sleep apnea.    Past Surgical History:  He has a past surgical history that includes XR shoulder (Right, 2012).      Social History:  He reports that he has never smoked. He has never used smokeless tobacco. He reports that he does not currently use alcohol. He reports that he does not use drugs.    Family History:  Family History   Problem Relation Name Age of Onset    Heart disease Mother Haritha     Stroke Mother Haritha     Hypertension Mother Haritha     Alcohol abuse Other      Stroke Other      Diabetes Other Grandfather         Does not specify which one    Diabetes Paternal Grandfather Ramirez       Mother with stroke at 58  Allergies:  Penicillins    Outpatient Medications:  Current Outpatient Medications   Medication Instructions    albuterol 90 mcg/actuation inhaler INHALE 2 PUFFS BY MOUTH PRIOR TO EXERTION/ EXERCISE AND EVERY 4 TO 6 HOURS AS NEEDED FOR COUGH OR SHORTNESS OF BREATH    dilTIAZem CD (CARDIZEM CD) 240 mg, oral, Daily    DULoxetine (CYMBALTA) 60 mg, oral, Daily    glipiZIDE (GLUCOTROL) 5 mg, oral, 2 times daily before meals    lisinopril 20 mg, oral, Daily    meloxicam (MOBIC) 15 mg, oral, Daily    metFORMIN (GLUCOPHAGE) 1,000 mg, oral, 2 times daily    metoprolol ta-hydrochlorothiaz (Lopressor HCT) 100-25 mg tablet 1 tablet, oral, Daily    Trulicity 3 mg, subcutaneous, Weekly         Last Recorded Vitals:  Vitals:    11/28/23 1557   BP: 132/74   BP Location: Left arm   Patient Position: Sitting   Pulse: 72   Resp: 16   SpO2: 98%   Weight: (!) 174 kg (383 lb)       Physical Exam:  Constitutional:       Appearance: Not in distress. Morbidly obese.   Neck:      Vascular: No JVR. JVD normal.   Pulmonary:      Effort: Pulmonary effort is normal.      Breath sounds: Normal breath sounds. No wheezing. No rhonchi. No rales.   Chest:      Chest wall: Not tender to palpatation.   Cardiovascular:      PMI at left midclavicular line. Normal rate. Regular rhythm. Normal S1. Normal S2.       Murmurs: There is no murmur.      No gallop.  No click. No rub.   Pulses:     Intact distal pulses.   Edema:     Peripheral edema absent.   Abdominal:      General: Abdomen is protuberant. Bowel sounds are normal.      Palpations: Abdomen is soft.      Tenderness: There is no abdominal tenderness.   Musculoskeletal: Normal range of motion.         General: No tenderness. Skin:     General: Skin is warm and dry.   Neurological:      General: No focal deficit present.      Mental Status: Alert and oriented to person, place and time.            Last Labs:  CBC -  Lab Results   Component Value Date    WBC 8.1 02/08/2023    HGB 15.4 02/08/2023    HCT 45.0 02/08/2023    MCV 83 02/08/2023     02/08/2023       CMP -  Lab Results   Component Value Date    CALCIUM 9.9 09/19/2023    PROT 7.3 09/19/2023    ALBUMIN 4.1 09/19/2023    AST 20 09/19/2023    ALT 32 09/19/2023    ALKPHOS 52 09/19/2023    BILITOT 0.5 09/19/2023       LIPID PANEL -   Lab Results   Component Value Date    CHOL 195 09/19/2023    TRIG 167 (H) 09/19/2023    HDL 39.3 (A) 09/19/2023    CHHDL 5.0 09/19/2023    LDLF 122 (H) 09/19/2023    VLDL 33 09/19/2023       RENAL FUNCTION PANEL -   Lab Results   Component Value Date    GLUCOSE 217 (H) 09/19/2023     09/19/2023    K 4.7 09/19/2023    CL 99 09/19/2023    CO2 32 09/19/2023    ANIONGAP 13 09/19/2023     BUN 11 09/19/2023    CREATININE 0.80 09/19/2023    GFRMALE >90 09/19/2023    CALCIUM 9.9 09/19/2023    ALBUMIN 4.1 09/19/2023        Lab Results   Component Value Date    HGBA1C 8.4 (A) 09/19/2023       Last Cardiology Tests:  ECG:  NSR  LVH  Lat T wave inversions      Echo:  3/2022      Ejection Fractions:  60-65%    Cath:  No results found for this or any previous visit from the past 1095 days.      Stress Test:  Non diagnostic stress echo 4/2023      Cardiac Imaging:  No results found for this or any previous visit from the past 1095 days.    Lab review: I have personally reviewed the laboratory result(s)     Assessment/Plan     Problem List Items Addressed This Visit       Hypertension    Overview     Adherent to therapy.  Has a home monitor .   BP good in office today         LYNDSEY treated with BiPAP    Overview     Wears CPAP         Abnormal EKG    Overview     Pt with deep ,lateral T wave inversions which are unchanged vs 2/2022 EKG  After last EKG had echo and stress-echo was OK,stress echo was indeterminant(inadequete exercise tolerance)-plan Lexiscan stress test         Pre-op evaluation    Overview     Per Revised Cardiac Risk Index  he would be considered low risk for cardiac M/M with gastric bypass however has limited functional status and abnormal EKG thus plan Lexiscan stress          Other Visit Diagnoses       Preop cardiovascular exam    -  Primary    Relevant Orders    ECG 12 Lead (Completed)         Lexiscan stress test      Blake Becerril DO

## 2023-11-30 ENCOUNTER — OFFICE VISIT (OUTPATIENT)
Dept: PULMONOLOGY | Facility: CLINIC | Age: 43
End: 2023-11-30
Payer: COMMERCIAL

## 2023-11-30 VITALS
DIASTOLIC BLOOD PRESSURE: 76 MMHG | HEART RATE: 68 BPM | TEMPERATURE: 97.3 F | WEIGHT: 315 LBS | SYSTOLIC BLOOD PRESSURE: 126 MMHG | OXYGEN SATURATION: 97 % | BODY MASS INDEX: 50.96 KG/M2

## 2023-11-30 DIAGNOSIS — Z01.818 PRE-OP EVALUATION: Primary | ICD-10-CM

## 2023-11-30 DIAGNOSIS — R06.09 DYSPNEA ON EXERTION: ICD-10-CM

## 2023-11-30 PROCEDURE — 3074F SYST BP LT 130 MM HG: CPT | Performed by: INTERNAL MEDICINE

## 2023-11-30 PROCEDURE — 3078F DIAST BP <80 MM HG: CPT | Performed by: INTERNAL MEDICINE

## 2023-11-30 PROCEDURE — 3008F BODY MASS INDEX DOCD: CPT | Performed by: INTERNAL MEDICINE

## 2023-11-30 PROCEDURE — 99204 OFFICE O/P NEW MOD 45 MIN: CPT | Performed by: INTERNAL MEDICINE

## 2023-11-30 PROCEDURE — 1036F TOBACCO NON-USER: CPT | Performed by: INTERNAL MEDICINE

## 2023-11-30 PROCEDURE — 3052F HG A1C>EQUAL 8.0%<EQUAL 9.0%: CPT | Performed by: INTERNAL MEDICINE

## 2023-11-30 PROCEDURE — 4010F ACE/ARB THERAPY RXD/TAKEN: CPT | Performed by: INTERNAL MEDICINE

## 2023-11-30 NOTE — PROGRESS NOTES
Subjective   Patient ID: Ramirez Rodriguez is a 42 y.o. male who presents for bariatric clearance (New patient).  HPI  This is a 42-year-old  male who is pursuing bariatric surgery to be done by Dr. Wheeler.  He does have some shortness of breath going up stairs and does not do any regular exercise.  Occasionally he has had episodes of wheezing.  He does have albuterol inhaler that he uses as needed shortness of breath.  I was asked to see the patient to clear him for surgery from a pulmonary perspective.  Patient has a history of hyperlipidemia and some pulmonary artery hypertension.  He has had right shoulder surgery that did not involve replacement.  His family medical history is positive for father with hypertension and mother with coronary artery disease CVA and hypertension.  Patient denies any smoking history and only uses alcohol on a monthly basis.  He is  and has 2 children.  He is a  in a prison facility.  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  He was born and raised in [place].  Review of Systems  Patient reports about a 6 pound weight loss over the past year.  He has type 2 diabetes.  He does have a history of hypertension and and wears contacts.  He has discomfort in his right hip which he was told is some osteoarthritis and he takes nonsteroidal medication for that on a daily basis.  All other review of systems were noncontributory.  Refer to the HPI.  Objective   Physical Exam  Patient's oxygen saturation was 96% on room air.  HEENT the patient has a class IV airway and chin was in good position.  Trachea was midline.  He has been on CPAP therapy for about 4 years.  Pulmonary, decreased breath sounds bilaterally but clear to auscultation.  The diminished breath sounds may be secondary to his obesity.  Cardio, heart sounds are regular rate and rhythm.  GI, bowel sounds were heard in all quadrants with no tenderness on palpation  of the abdomen.  Extremities, no pretibial edema, cyanosis or clubbing.  Skin, no abnormal rashes or skin lesions were noted.  Psych, the patient is alert and oriented x3.  Assessment/Plan       Impressions:  1.  Preop pulmonary clearance for bariatric surgery.  2.  Dyspnea on exertion.  Recommendations:  1.  Complete pulmonary function testing.  2.  FeNO.  3.  Simple pulmonary stress test.  All of the above testing will be done at [facility].  4.  Follow-up with the patient on the same day as the testing is completed.  5.  Left further recommendations pending the results of the tests.      This note was transcribed using the Dragon Dictation system.  There may be grammatical, punctuation, or verbiage errors that occur with voice recognition programs.

## 2023-12-05 ENCOUNTER — NUTRITION (OUTPATIENT)
Dept: SURGERY | Facility: CLINIC | Age: 43
End: 2023-12-05
Payer: COMMERCIAL

## 2023-12-05 VITALS — HEIGHT: 74 IN | WEIGHT: 315 LBS | BODY MASS INDEX: 40.43 KG/M2

## 2023-12-05 NOTE — PROGRESS NOTES
PREOPERATIVE, MULTIDISCIPLINARY, MEDICALLY SUPERVISED, REDUCED CALORIE DIET, BEHAVIOR MODIFICATION AND EXERCISE PROGRAM    S:  Pt still drinks 16 of pop either once a day or every other day.  He has cut back on eating snacks.  Pt is drinking >64 os water/day.  Pt stopped drinking coffee.  Pt has been practicing the 30-30-30 rule.  Pt is eating slower and chewing well. Pt takes about 10-15 min.  Pt will start going to they gym 3-4x/week    Usual intake:  B: 3 handfuls of nuts  and Nutrigrain bars   S:  none  L:  leftovers from night before (chicken/pork, veggies, starch (noodles/rice/potato)  S;    D: chicken/pork, veggies, starch (noodles/rice/potato  S:     O:    Wt:    378.4  Ht:   73.5 in           Body mass index is 49.25 kg/m².    Pt is s/p lap appendectomy 12/4      Goal: 5% body weight loss over the course of program    Dietary recommendation:   1. Eliminate high calorie, carbonated, and caffeinated beverages  2. Continue to practice the 30-30-30 rule by drinking between meals.  3. Measure your portions at your meals.    4. Have 4 oz of protein at your meals.   Switch to eggs/turkey products/low fat dairy for your protein at breakfast. Have 2 high protein snacks per day.   5. Increase intake of non-starchy vegetables.  Have 5 servings fruits and vegetables daily.   6. Start taking a multivitamin daily.  7. Start going to the gym when your restrictions are lifted.       A/P:    Pt is making some good changes.  He will continue to work on building on the changes he has made, exercise and lose weight.  He will follow up in 1 month    Exercise: none.  Average 6000-10,000 steps per day    Karis Kirk RD, DEE

## 2023-12-06 ENCOUNTER — APPOINTMENT (OUTPATIENT)
Dept: PULMONOLOGY | Facility: CLINIC | Age: 43
End: 2023-12-06
Payer: COMMERCIAL

## 2023-12-06 ENCOUNTER — APPOINTMENT (OUTPATIENT)
Dept: CARDIOLOGY | Facility: HOSPITAL | Age: 43
End: 2023-12-06
Payer: COMMERCIAL

## 2023-12-06 ENCOUNTER — APPOINTMENT (OUTPATIENT)
Dept: RESPIRATORY THERAPY | Facility: HOSPITAL | Age: 43
End: 2023-12-06
Payer: COMMERCIAL

## 2023-12-13 ENCOUNTER — APPOINTMENT (OUTPATIENT)
Dept: GASTROENTEROLOGY | Facility: HOSPITAL | Age: 43
End: 2023-12-13
Payer: COMMERCIAL

## 2023-12-15 ENCOUNTER — LAB (OUTPATIENT)
Dept: LAB | Facility: LAB | Age: 43
End: 2023-12-15
Payer: COMMERCIAL

## 2023-12-15 ENCOUNTER — APPOINTMENT (OUTPATIENT)
Dept: LAB | Facility: CLINIC | Age: 43
End: 2023-12-15
Payer: COMMERCIAL

## 2023-12-15 DIAGNOSIS — I10 HYPERTENSION, UNSPECIFIED TYPE: ICD-10-CM

## 2023-12-15 DIAGNOSIS — Z98.84 BARIATRIC SURGERY STATUS: ICD-10-CM

## 2023-12-15 DIAGNOSIS — E11.65 TYPE 2 DIABETES MELLITUS WITH HYPERGLYCEMIA, WITHOUT LONG-TERM CURRENT USE OF INSULIN (MULTI): ICD-10-CM

## 2023-12-15 PROCEDURE — 83540 ASSAY OF IRON: CPT

## 2023-12-15 PROCEDURE — 80061 LIPID PANEL: CPT

## 2023-12-15 PROCEDURE — 80323 ALKALOIDS NOS: CPT

## 2023-12-15 PROCEDURE — 84439 ASSAY OF FREE THYROXINE: CPT

## 2023-12-15 PROCEDURE — 83550 IRON BINDING TEST: CPT

## 2023-12-15 PROCEDURE — 82607 VITAMIN B-12: CPT

## 2023-12-15 PROCEDURE — 83013 H PYLORI (C-13) BREATH: CPT

## 2023-12-15 PROCEDURE — 82746 ASSAY OF FOLIC ACID SERUM: CPT

## 2023-12-15 PROCEDURE — 85025 COMPLETE CBC W/AUTO DIFF WBC: CPT

## 2023-12-15 PROCEDURE — 84443 ASSAY THYROID STIM HORMONE: CPT

## 2023-12-15 PROCEDURE — 84425 ASSAY OF VITAMIN B-1: CPT

## 2023-12-15 PROCEDURE — 83036 HEMOGLOBIN GLYCOSYLATED A1C: CPT

## 2023-12-15 PROCEDURE — 83970 ASSAY OF PARATHORMONE: CPT

## 2023-12-15 PROCEDURE — 36415 COLL VENOUS BLD VENIPUNCTURE: CPT

## 2023-12-15 PROCEDURE — 82525 ASSAY OF COPPER: CPT

## 2023-12-15 PROCEDURE — 84630 ASSAY OF ZINC: CPT

## 2023-12-15 PROCEDURE — 85610 PROTHROMBIN TIME: CPT

## 2023-12-15 PROCEDURE — 82306 VITAMIN D 25 HYDROXY: CPT

## 2023-12-15 PROCEDURE — 80053 COMPREHEN METABOLIC PANEL: CPT

## 2023-12-15 PROCEDURE — 80307 DRUG TEST PRSMV CHEM ANLYZR: CPT

## 2023-12-15 PROCEDURE — 82728 ASSAY OF FERRITIN: CPT

## 2023-12-15 PROCEDURE — 85730 THROMBOPLASTIN TIME PARTIAL: CPT

## 2023-12-15 PROCEDURE — 86140 C-REACTIVE PROTEIN: CPT

## 2023-12-16 LAB
25(OH)D3 SERPL-MCNC: 8 NG/ML (ref 30–100)
ALBUMIN SERPL BCP-MCNC: 4.1 G/DL (ref 3.4–5)
ALP SERPL-CCNC: 44 U/L (ref 33–120)
ALT SERPL W P-5'-P-CCNC: 30 U/L (ref 10–52)
AMPHETAMINES UR QL SCN: NORMAL
ANION GAP SERPL CALC-SCNC: 16 MMOL/L (ref 10–20)
APTT PPP: 31 SECONDS (ref 27–38)
AST SERPL W P-5'-P-CCNC: 16 U/L (ref 9–39)
BARBITURATES UR QL SCN: NORMAL
BASOPHILS # BLD AUTO: 0.04 X10*3/UL (ref 0–0.1)
BASOPHILS NFR BLD AUTO: 0.6 %
BENZODIAZ UR QL SCN: NORMAL
BILIRUB SERPL-MCNC: 0.4 MG/DL (ref 0–1.2)
BUN SERPL-MCNC: 15 MG/DL (ref 6–23)
BZE UR QL SCN: NORMAL
CALCIUM SERPL-MCNC: 9.4 MG/DL (ref 8.6–10.6)
CANNABINOIDS UR QL SCN: NORMAL
CHLORIDE SERPL-SCNC: 101 MMOL/L (ref 98–107)
CHOLEST SERPL-MCNC: 177 MG/DL (ref 0–199)
CHOLESTEROL/HDL RATIO: 4.2
CO2 SERPL-SCNC: 26 MMOL/L (ref 21–32)
CREAT SERPL-MCNC: 0.89 MG/DL (ref 0.5–1.3)
CRP SERPL-MCNC: 0.73 MG/DL
EOSINOPHIL # BLD AUTO: 0.29 X10*3/UL (ref 0–0.7)
EOSINOPHIL NFR BLD AUTO: 4.4 %
ERYTHROCYTE [DISTWIDTH] IN BLOOD BY AUTOMATED COUNT: 13.9 % (ref 11.5–14.5)
EST. AVERAGE GLUCOSE BLD GHB EST-MCNC: 146 MG/DL
FENTANYL+NORFENTANYL UR QL SCN: NORMAL
FERRITIN SERPL-MCNC: 205 NG/ML (ref 20–300)
FOLATE SERPL-MCNC: 13.6 NG/ML
GFR SERPL CREATININE-BSD FRML MDRD: >90 ML/MIN/1.73M*2
GLUCOSE SERPL-MCNC: 144 MG/DL (ref 74–99)
HBA1C MFR BLD: 6.7 %
HCT VFR BLD AUTO: 39.8 % (ref 41–52)
HDLC SERPL-MCNC: 42.4 MG/DL
HGB BLD-MCNC: 13.2 G/DL (ref 13.5–17.5)
IMM GRANULOCYTES # BLD AUTO: 0.03 X10*3/UL (ref 0–0.7)
IMM GRANULOCYTES NFR BLD AUTO: 0.5 % (ref 0–0.9)
INR PPP: 1 (ref 0.9–1.1)
IRON SATN MFR SERPL: 19 % (ref 25–45)
IRON SERPL-MCNC: 76 UG/DL (ref 35–150)
LDLC SERPL CALC-MCNC: 106 MG/DL
LYMPHOCYTES # BLD AUTO: 2.36 X10*3/UL (ref 1.2–4.8)
LYMPHOCYTES NFR BLD AUTO: 35.5 %
MCH RBC QN AUTO: 28.2 PG (ref 26–34)
MCHC RBC AUTO-ENTMCNC: 33.2 G/DL (ref 32–36)
MCV RBC AUTO: 85 FL (ref 80–100)
MONOCYTES # BLD AUTO: 0.5 X10*3/UL (ref 0.1–1)
MONOCYTES NFR BLD AUTO: 7.5 %
NEUTROPHILS # BLD AUTO: 3.42 X10*3/UL (ref 1.2–7.7)
NEUTROPHILS NFR BLD AUTO: 51.5 %
NON HDL CHOLESTEROL: 135 MG/DL (ref 0–149)
NRBC BLD-RTO: 0 /100 WBCS (ref 0–0)
OPIATES UR QL SCN: NORMAL
OXYCODONE+OXYMORPHONE UR QL SCN: NORMAL
PCP UR QL SCN: NORMAL
PLATELET # BLD AUTO: 287 X10*3/UL (ref 150–450)
POTASSIUM SERPL-SCNC: 4.5 MMOL/L (ref 3.5–5.3)
PROT SERPL-MCNC: 6.9 G/DL (ref 6.4–8.2)
PROTHROMBIN TIME: 11.4 SECONDS (ref 9.8–12.8)
PTH-INTACT SERPL-MCNC: 42.7 PG/ML (ref 18.5–88)
RBC # BLD AUTO: 4.68 X10*6/UL (ref 4.5–5.9)
SODIUM SERPL-SCNC: 138 MMOL/L (ref 136–145)
T4 FREE SERPL-MCNC: 0.98 NG/DL (ref 0.78–1.48)
TIBC SERPL-MCNC: 402 UG/DL (ref 240–445)
TRIGL SERPL-MCNC: 143 MG/DL (ref 0–149)
TSH SERPL-ACNC: 2.63 MIU/L (ref 0.44–3.98)
UIBC SERPL-MCNC: 326 UG/DL (ref 110–370)
UREA BREATH TEST QL: NEGATIVE
VIT B12 SERPL-MCNC: 323 PG/ML (ref 211–911)
VLDL: 29 MG/DL (ref 0–40)
WBC # BLD AUTO: 6.6 X10*3/UL (ref 4.4–11.3)

## 2023-12-18 ENCOUNTER — HOSPITAL ENCOUNTER (OUTPATIENT)
Dept: CARDIOLOGY | Facility: HOSPITAL | Age: 43
Discharge: HOME | End: 2023-12-18
Payer: COMMERCIAL

## 2023-12-18 ENCOUNTER — HOSPITAL ENCOUNTER (OUTPATIENT)
Dept: RESPIRATORY THERAPY | Facility: HOSPITAL | Age: 43
Discharge: HOME | End: 2023-12-18
Payer: COMMERCIAL

## 2023-12-18 ENCOUNTER — OFFICE VISIT (OUTPATIENT)
Dept: PULMONOLOGY | Facility: CLINIC | Age: 43
End: 2023-12-18
Payer: COMMERCIAL

## 2023-12-18 VITALS
WEIGHT: 315 LBS | SYSTOLIC BLOOD PRESSURE: 112 MMHG | DIASTOLIC BLOOD PRESSURE: 61 MMHG | BODY MASS INDEX: 40.43 KG/M2 | OXYGEN SATURATION: 97 % | TEMPERATURE: 97.1 F | HEIGHT: 74 IN | HEART RATE: 66 BPM

## 2023-12-18 DIAGNOSIS — Z01.818 PREOPERATIVE CLEARANCE: Primary | ICD-10-CM

## 2023-12-18 DIAGNOSIS — Z01.818 PRE-OP EVALUATION: ICD-10-CM

## 2023-12-18 PROCEDURE — 3074F SYST BP LT 130 MM HG: CPT | Performed by: INTERNAL MEDICINE

## 2023-12-18 PROCEDURE — 99213 OFFICE O/P EST LOW 20 MIN: CPT | Performed by: INTERNAL MEDICINE

## 2023-12-18 PROCEDURE — 3078F DIAST BP <80 MM HG: CPT | Performed by: INTERNAL MEDICINE

## 2023-12-18 PROCEDURE — 3008F BODY MASS INDEX DOCD: CPT | Performed by: INTERNAL MEDICINE

## 2023-12-18 PROCEDURE — 94729 DIFFUSING CAPACITY: CPT

## 2023-12-18 PROCEDURE — 1036F TOBACCO NON-USER: CPT | Performed by: INTERNAL MEDICINE

## 2023-12-18 PROCEDURE — 3044F HG A1C LEVEL LT 7.0%: CPT | Performed by: INTERNAL MEDICINE

## 2023-12-18 PROCEDURE — 3049F LDL-C 100-129 MG/DL: CPT | Performed by: INTERNAL MEDICINE

## 2023-12-18 PROCEDURE — 4010F ACE/ARB THERAPY RXD/TAKEN: CPT | Performed by: INTERNAL MEDICINE

## 2023-12-18 NOTE — PROGRESS NOTES
Subjective   Patient ID: Ramirez Rodriguez is a 42 y.o. male who presents for Follow-up.  HPI  I saw the patient today in the office and reviewed with him the results of his pulmonary function testing that showed that he had a good 6-minute walk test and was able to walk 1400 feet with no oxygen desaturation.  His FeNO was normal.  I also reviewed with the patient the results of his pulmonary function testing which shows normal spirometry with no significant improvement postbronchodilator.  There was a mild reduction in lung volumes most likely due to the patient's obesity.  There was no significant change in airway resistance.  There was normal gas transfer.  Review of Systems  Has been no change in review of systems.  Objective   Physical Exam  Pulmonary, lungs were clear to auscultation.  Cardio, heart sounds are regular rate and rhythm.  Extremities, no pretibial edema, cyanosis or clubbing.  Psych, the patient is alert and oriented x 3.  Assessment/Plan        Impressions:  1.  Patient is CLEARED from a pulmonary viewpoint for his bariatric surgery.  Recommendations:  1.  I do not have any further testing needed.      This note was transcribed using the Dragon Dictation system.  There may be grammatical, punctuation, or verbiage errors that occur with voice recognition programs.    Sharad Mayer,  12/18/23 1:21 PM

## 2023-12-19 LAB
COPPER SERPL-MCNC: 118 UG/DL (ref 70–140)
COTININE SERPL-MCNC: <5 NG/ML
NICOTINE SERPL-MCNC: <5 NG/ML
ZINC SERPL-MCNC: 94.5 UG/DL (ref 60–120)

## 2023-12-20 ENCOUNTER — OFFICE VISIT (OUTPATIENT)
Dept: PRIMARY CARE | Facility: CLINIC | Age: 43
End: 2023-12-20
Payer: COMMERCIAL

## 2023-12-20 VITALS
DIASTOLIC BLOOD PRESSURE: 69 MMHG | SYSTOLIC BLOOD PRESSURE: 143 MMHG | BODY MASS INDEX: 49.7 KG/M2 | TEMPERATURE: 98.7 F | WEIGHT: 315 LBS | HEART RATE: 65 BPM | OXYGEN SATURATION: 98 %

## 2023-12-20 DIAGNOSIS — G47.33 OSA TREATED WITH BIPAP: ICD-10-CM

## 2023-12-20 DIAGNOSIS — M25.551 PAIN OF RIGHT HIP: ICD-10-CM

## 2023-12-20 DIAGNOSIS — M25.551 HIP PAIN, RIGHT: ICD-10-CM

## 2023-12-20 DIAGNOSIS — E11.9 TYPE 2 DIABETES MELLITUS WITHOUT COMPLICATION, WITHOUT LONG-TERM CURRENT USE OF INSULIN (MULTI): Primary | ICD-10-CM

## 2023-12-20 LAB — VIT B1 PYROPHOSHATE BLD-SCNC: 122 NMOL/L (ref 70–180)

## 2023-12-20 PROCEDURE — 3044F HG A1C LEVEL LT 7.0%: CPT | Performed by: FAMILY MEDICINE

## 2023-12-20 PROCEDURE — 3049F LDL-C 100-129 MG/DL: CPT | Performed by: FAMILY MEDICINE

## 2023-12-20 PROCEDURE — 1036F TOBACCO NON-USER: CPT | Performed by: FAMILY MEDICINE

## 2023-12-20 PROCEDURE — 99214 OFFICE O/P EST MOD 30 MIN: CPT | Performed by: FAMILY MEDICINE

## 2023-12-20 PROCEDURE — 3008F BODY MASS INDEX DOCD: CPT | Performed by: FAMILY MEDICINE

## 2023-12-20 PROCEDURE — 4010F ACE/ARB THERAPY RXD/TAKEN: CPT | Performed by: FAMILY MEDICINE

## 2023-12-20 PROCEDURE — 3077F SYST BP >= 140 MM HG: CPT | Performed by: FAMILY MEDICINE

## 2023-12-20 PROCEDURE — 3078F DIAST BP <80 MM HG: CPT | Performed by: FAMILY MEDICINE

## 2023-12-20 RX ORDER — MELOXICAM 15 MG/1
15 TABLET ORAL DAILY
Qty: 30 TABLET | Refills: 5 | Status: SHIPPED | OUTPATIENT
Start: 2023-12-20 | End: 2024-01-30

## 2023-12-20 RX ORDER — DULAGLUTIDE 3 MG/.5ML
3 INJECTION, SOLUTION SUBCUTANEOUS
Qty: 2 ML | Refills: 1 | Status: SHIPPED | OUTPATIENT
Start: 2023-12-20 | End: 2024-05-11 | Stop reason: SDUPTHER

## 2023-12-20 RX ORDER — METFORMIN HYDROCHLORIDE 1000 MG/1
1000 TABLET ORAL 2 TIMES DAILY
Qty: 60 TABLET | Refills: 5 | Status: SHIPPED | OUTPATIENT
Start: 2023-12-20

## 2023-12-20 NOTE — PROGRESS NOTES
Subjective   Patient ID: Ramirez Rodriguez is a 42 y.o. male who presents for Follow-up, Hypertension, and Diabetes.    HPI  Pt here for 3  month visit.      [facility] :  acute appendicitis, about 10 d ago.  [facility] - s/p appendectomy .      Interval Changes in PMHx. PSHx, FMHx :  appendectomy , lap .  Not having pain. Having regular Bms . No urinary sxs   Having assessments as part of work up for bariatric surgery - pulmonary consult , has to get pychology consult .  Joined [employer] and started exercising .     Concerns/Questions:     None     Review of Systems    Objective   /69 (BP Location: Right arm, Patient Position: Sitting, BP Cuff Size: Large adult)   Pulse 65   Temp 37.1 °C (98.7 °F) (Temporal)   Wt (!) 176 kg (387 lb 1.6 oz)   SpO2 98%   BMI 49.70 kg/m²     Physical Exam  Vitals reviewed.   Constitutional:       General: He is not in acute distress.  Cardiovascular:      Rate and Rhythm: Normal rate and regular rhythm.      Heart sounds: Normal heart sounds.   Pulmonary:      Effort: Pulmonary effort is normal.      Breath sounds: No wheezing or rales.   Neurological:      General: No focal deficit present.      Mental Status: He is alert.         Assessment/Plan   Problem List Items Addressed This Visit          Medium    Hip pain, right    LYNDSEY treated with BiPAP    Type 2 diabetes mellitus without complication, without long-term current use of insulin (CMS/[facility]) - Primary    Relevant Medications    metFORMIN (Glucophage) 1,000 mg tablet    dulaglutide (Trulicity) 3 mg/0.5 mL pen injector     Other Visit Diagnoses       Pain of right hip        Relevant Medications    meloxicam (Mobic) 15 mg tablet            DM2   Lab Results   Component Value Date    HGBA1C 6.7 (H) 12/15/2023   Continue current regimen of treatment , and exercise/ movement    LYNDSEY ,   Adherent to BiPAP   Hip pain ,  -renewed Nsaid     S/p appendectomy for appendicitis.  Melanie Tarango MD

## 2024-01-03 ENCOUNTER — APPOINTMENT (OUTPATIENT)
Dept: SURGERY | Facility: CLINIC | Age: 44
End: 2024-01-03
Payer: COMMERCIAL

## 2024-01-03 ENCOUNTER — ANESTHESIA EVENT (OUTPATIENT)
Dept: GASTROENTEROLOGY | Facility: HOSPITAL | Age: 44
End: 2024-01-03
Payer: COMMERCIAL

## 2024-01-03 ENCOUNTER — ANESTHESIA (OUTPATIENT)
Dept: GASTROENTEROLOGY | Facility: HOSPITAL | Age: 44
End: 2024-01-03
Payer: COMMERCIAL

## 2024-01-03 ENCOUNTER — HOSPITAL ENCOUNTER (OUTPATIENT)
Dept: GASTROENTEROLOGY | Facility: HOSPITAL | Age: 44
Discharge: HOME | End: 2024-01-03
Payer: COMMERCIAL

## 2024-01-03 VITALS
RESPIRATION RATE: 18 BRPM | HEART RATE: 68 BPM | OXYGEN SATURATION: 97 % | DIASTOLIC BLOOD PRESSURE: 53 MMHG | SYSTOLIC BLOOD PRESSURE: 110 MMHG | WEIGHT: 315 LBS | HEIGHT: 72 IN | BODY MASS INDEX: 42.66 KG/M2 | TEMPERATURE: 97.7 F

## 2024-01-03 DIAGNOSIS — Z98.84 BARIATRIC SURGERY STATUS: ICD-10-CM

## 2024-01-03 LAB — GLUCOSE BLD MANUAL STRIP-MCNC: 223 MG/DL (ref 74–99)

## 2024-01-03 PROCEDURE — A43239 PR EDG TRANSORAL BIOPSY SINGLE/MULTIPLE: Performed by: ANESTHESIOLOGY

## 2024-01-03 PROCEDURE — 7100000010 HC PHASE TWO TIME - EACH INCREMENTAL 1 MINUTE: Performed by: SURGERY

## 2024-01-03 PROCEDURE — 88305 TISSUE EXAM BY PATHOLOGIST: CPT | Mod: TC,SUR,PARLAB | Performed by: SURGERY

## 2024-01-03 PROCEDURE — A43239 PR EDG TRANSORAL BIOPSY SINGLE/MULTIPLE

## 2024-01-03 PROCEDURE — 43239 EGD BIOPSY SINGLE/MULTIPLE: CPT | Performed by: SURGERY

## 2024-01-03 PROCEDURE — 88305 TISSUE EXAM BY PATHOLOGIST: CPT | Performed by: STUDENT IN AN ORGANIZED HEALTH CARE EDUCATION/TRAINING PROGRAM

## 2024-01-03 PROCEDURE — 2500000005 HC RX 250 GENERAL PHARMACY W/O HCPCS

## 2024-01-03 PROCEDURE — 2500000004 HC RX 250 GENERAL PHARMACY W/ HCPCS (ALT 636 FOR OP/ED)

## 2024-01-03 PROCEDURE — 88342 IMHCHEM/IMCYTCHM 1ST ANTB: CPT | Performed by: STUDENT IN AN ORGANIZED HEALTH CARE EDUCATION/TRAINING PROGRAM

## 2024-01-03 PROCEDURE — 7100000009 HC PHASE TWO TIME - INITIAL BASE CHARGE: Performed by: SURGERY

## 2024-01-03 PROCEDURE — 3700000002 HC GENERAL ANESTHESIA TIME - EACH INCREMENTAL 1 MINUTE: Performed by: SURGERY

## 2024-01-03 PROCEDURE — 3700000001 HC GENERAL ANESTHESIA TIME - INITIAL BASE CHARGE: Performed by: SURGERY

## 2024-01-03 PROCEDURE — 82947 ASSAY GLUCOSE BLOOD QUANT: CPT

## 2024-01-03 RX ORDER — LIDOCAINE HCL/PF 100 MG/5ML
SYRINGE (ML) INTRAVENOUS AS NEEDED
Status: DISCONTINUED | OUTPATIENT
Start: 2024-01-03 | End: 2024-01-03

## 2024-01-03 RX ORDER — PROPOFOL 10 MG/ML
INJECTION, EMULSION INTRAVENOUS AS NEEDED
Status: DISCONTINUED | OUTPATIENT
Start: 2024-01-03 | End: 2024-01-03

## 2024-01-03 RX ORDER — MIDAZOLAM HYDROCHLORIDE 1 MG/ML
INJECTION, SOLUTION INTRAMUSCULAR; INTRAVENOUS AS NEEDED
Status: DISCONTINUED | OUTPATIENT
Start: 2024-01-03 | End: 2024-01-03

## 2024-01-03 RX ORDER — PROPOFOL 10 MG/ML
INJECTION, EMULSION INTRAVENOUS CONTINUOUS PRN
Status: DISCONTINUED | OUTPATIENT
Start: 2024-01-03 | End: 2024-01-03

## 2024-01-03 RX ORDER — SODIUM CHLORIDE, SODIUM LACTATE, POTASSIUM CHLORIDE, CALCIUM CHLORIDE 600; 310; 30; 20 MG/100ML; MG/100ML; MG/100ML; MG/100ML
20 INJECTION, SOLUTION INTRAVENOUS CONTINUOUS
Status: DISCONTINUED | OUTPATIENT
Start: 2024-01-03 | End: 2024-01-04 | Stop reason: HOSPADM

## 2024-01-03 RX ADMIN — MIDAZOLAM 2 MG: 1 INJECTION INTRAMUSCULAR; INTRAVENOUS at 08:06

## 2024-01-03 RX ADMIN — PROPOFOL 80 MG: 10 INJECTION, EMULSION INTRAVENOUS at 08:06

## 2024-01-03 RX ADMIN — PROPOFOL 20 MG: 10 INJECTION, EMULSION INTRAVENOUS at 08:09

## 2024-01-03 RX ADMIN — PROPOFOL 20 MG: 10 INJECTION, EMULSION INTRAVENOUS at 08:07

## 2024-01-03 RX ADMIN — PROPOFOL 20 MG: 10 INJECTION, EMULSION INTRAVENOUS at 08:12

## 2024-01-03 RX ADMIN — LIDOCAINE HYDROCHLORIDE 40 MG: 20 INJECTION INTRAVENOUS at 08:06

## 2024-01-03 RX ADMIN — PROPOFOL 130 MCG/KG/MIN: 10 INJECTION, EMULSION INTRAVENOUS at 08:06

## 2024-01-03 RX ADMIN — SODIUM CHLORIDE, SODIUM LACTATE, POTASSIUM CHLORIDE, AND CALCIUM CHLORIDE: .6; .31; .03; .02 INJECTION, SOLUTION INTRAVENOUS at 08:04

## 2024-01-03 ASSESSMENT — ENCOUNTER SYMPTOMS
RHINORRHEA: 0
PALPITATIONS: 0
FACIAL SWELLING: 0
TREMORS: 0
CONFUSION: 0
LIGHT-HEADEDNESS: 0
CHOKING: 0
HALLUCINATIONS: 0
SINUS PAIN: 0
CHEST TIGHTNESS: 0
CHILLS: 0
FLANK PAIN: 0
NUMBNESS: 0
POLYPHAGIA: 0
AGITATION: 0
FEVER: 0
VOMITING: 0
POLYDIPSIA: 0
DIFFICULTY URINATING: 0
APNEA: 0
NAUSEA: 0
COLOR CHANGE: 0

## 2024-01-03 ASSESSMENT — PAIN SCALES - GENERAL
PAINLEVEL_OUTOF10: 0 - NO PAIN
PAIN_LEVEL: 0
PAINLEVEL_OUTOF10: 0 - NO PAIN

## 2024-01-03 ASSESSMENT — PAIN - FUNCTIONAL ASSESSMENT
PAIN_FUNCTIONAL_ASSESSMENT: 0-10

## 2024-01-03 ASSESSMENT — COLUMBIA-SUICIDE SEVERITY RATING SCALE - C-SSRS
2. HAVE YOU ACTUALLY HAD ANY THOUGHTS OF KILLING YOURSELF?: NO
1. IN THE PAST MONTH, HAVE YOU WISHED YOU WERE DEAD OR WISHED YOU COULD GO TO SLEEP AND NOT WAKE UP?: NO
6. HAVE YOU EVER DONE ANYTHING, STARTED TO DO ANYTHING, OR PREPARED TO DO ANYTHING TO END YOUR LIFE?: NO

## 2024-01-03 NOTE — ANESTHESIA POSTPROCEDURE EVALUATION
Patient: Ramirez Rodriguez    Procedure Summary       Date: 01/03/24 Room / Location: [facility]    Anesthesia Start: 0804 Anesthesia Stop: 0821    Procedure: EGD Diagnosis: Bariatric surgery status    Scheduled Providers: Tiny Wheeler MD; Mick Hinds MD Responsible Provider: Mick Hinds MD    Anesthesia Type: MAC ASA Status: 3            Anesthesia Type: MAC    Vitals Value Taken Time   /60 01/03/24 0821   Temp 36.5 01/03/24 0821   Pulse 76 01/03/24 0821   Resp 16 01/03/24 0821   SpO2 94% 01/03/24 0821       Anesthesia Post Evaluation    Patient location during evaluation: PACU  Patient participation: complete - patient participated  Level of consciousness: awake and alert  Pain score: 0  Pain management: satisfactory to patient  Airway patency: patent  Cardiovascular status: acceptable and hemodynamically stable  Respiratory status: acceptable  Hydration status: acceptable  Postoperative Nausea and Vomiting: none      There were no known notable events for this encounter.

## 2024-01-03 NOTE — ANESTHESIA PREPROCEDURE EVALUATION
Patient: Ramirez Rodriguez    Procedure Information       Date/Time: 01/03/24 0830    Scheduled providers: Tiny Wheeler MD; Mick Hinds MD    Procedure: EGD    Location: [facility]            Relevant Problems   Anesthesia (within normal limits)      Cardiovascular   (+) Abnormal EKG   (+) Hyperlipidemia, mild   (+) Hypertension      Endocrine   (+) Type 2 diabetes mellitus with hyperglycemia (CMS/HCC)   (+) Type 2 diabetes mellitus without complication, without long-term current use of insulin (CMS/HCC)      Pulmonary   (+) LYNDSEY treated with BiPAP   (+) Shortness of breath on exertion       Clinical information reviewed:    Allergies                NPO Detail:  NPO/Void Status  Date of Last Liquid: 01/03/24  Time of Last Liquid: 0000  Date of Last Solid: 01/02/24  Time of Last Solid: 2100  Last Intake Type: Light meal         Physical Exam    Airway  Mallampati: III  TM distance: >3 FB  Neck ROM: full     Cardiovascular - normal exam  Rhythm: regular  Rate: normal     Dental - normal exam     Pulmonary - normal exam     Abdominal            Anesthesia Plan    ASA 3     MAC     Education provided regarding risk of obstructive sleep apnea.  intravenous induction   Anesthetic plan and risks discussed with patient.    Plan discussed with CRNA, CAA and attending.

## 2024-01-03 NOTE — H&P
History Of Present Illness  Ramirez Rodriguez is a 43 y.o. male presenting with morbid obesity (Body mass index is 48.93 kg/m².) who presents to clinic for consideration of bariatric surgery. he has attempted and failed multiple diet and exercise regimens for weight loss. Initial Onset of obesity was in childhood.  Their goal for surgery is to  be healthier  and lose weight. The patient has tried multiple diets to lose weight including Weight Watchers. The patient was most successful with the Weight Watchers . The most pounds lost on this diet were 25 lbs. The patient considers their dietary weakness to be  chocolates and pop  The patient reports a  highest weight ever of 399 pounds and lowest weight ever of 376 pounds Distribution of Obesity: is central. The patient does not exercise   Comorbidities: high cholesterol, diabetes managed by oral medication , sleep apnea using an appliance, and hypertension controlled with oral meds     Past Medical History  Past Medical History:   Diagnosis Date    Diabetes mellitus (CMS/HCC)     HTN (hypertension)     Hyperlipidemia     Morbid obesity (CMS/HCC)     Morbid obesity (CMS/HCC)     Pulmonary arterial hypertension (CMS/HCC)     Severe obstructive sleep apnea        Surgical History  Past Surgical History:   Procedure Laterality Date    APPENDECTOMY  12/04/2023    SHOULDER Right 2012        Social History  He reports that he has never smoked. He has never used smokeless tobacco. He reports that he does not currently use alcohol. He reports that he does not use drugs.    Family History  Family History   Problem Relation Name Age of Onset    Heart disease Mother Haritha     Stroke Mother Haritha     Hypertension Mother Haritha     Alcohol abuse Other      Stroke Other      Diabetes Other Grandfather         Does not specify which one    Diabetes Paternal Grandfather Ramirez         Allergies  Penicillins    Review of Systems   Constitutional:  Negative for chills and fever.   HENT:   Negative for facial swelling, nosebleeds, rhinorrhea and sinus pain.    Respiratory:  Negative for apnea, choking and chest tightness.    Cardiovascular:  Negative for chest pain, palpitations and leg swelling.   Gastrointestinal:  Negative for nausea and vomiting.   Endocrine: Negative for polydipsia, polyphagia and polyuria.   Genitourinary:  Negative for difficulty urinating, flank pain and urgency.   Skin:  Negative for color change, pallor and rash.   Neurological:  Negative for tremors, light-headedness and numbness.   Psychiatric/Behavioral:  Negative for agitation, behavioral problems, confusion, hallucinations and self-injury.         Physical Exam  Constitutional:       General: He is not in acute distress.     Appearance: Normal appearance.   HENT:      Head: Normocephalic and atraumatic.      Nose: Nose normal.      Mouth/Throat:      Pharynx: Oropharynx is clear.   Cardiovascular:      Rate and Rhythm: Normal rate and regular rhythm.   Pulmonary:      Effort: Pulmonary effort is normal.      Breath sounds: No stridor. No wheezing.   Abdominal:      General: Abdomen is flat. There is no distension.      Palpations: Abdomen is soft.      Tenderness: There is no abdominal tenderness.   Musculoskeletal:         General: No swelling, tenderness or deformity.      Cervical back: Normal range of motion and neck supple.   Skin:     General: Skin is warm and dry.   Neurological:      General: No focal deficit present.      Mental Status: He is alert and oriented to person, place, and time. Mental status is at baseline.   Psychiatric:         Mood and Affect: Mood normal.         Behavior: Behavior normal.         Judgment: Judgment normal.          Last Recorded Vitals  There were no vitals taken for this visit.    Relevant Results             Assessment/Plan       EGD for reflux to r/o esophagitis, gastritis, ulcers and hiatal hernias          I spent 25 minutes in the professional and overall care of this  patient.      Xander Mendoza MD

## 2024-01-09 ENCOUNTER — ANCILLARY PROCEDURE (OUTPATIENT)
Dept: RADIOLOGY | Facility: CLINIC | Age: 44
End: 2024-01-09
Payer: COMMERCIAL

## 2024-01-09 ENCOUNTER — HOSPITAL ENCOUNTER (OUTPATIENT)
Dept: CARDIOLOGY | Facility: CLINIC | Age: 44
Discharge: HOME | End: 2024-01-09
Payer: COMMERCIAL

## 2024-01-09 DIAGNOSIS — Z01.810 PREOP CARDIOVASCULAR EXAM: Primary | ICD-10-CM

## 2024-01-09 DIAGNOSIS — I25.10 CAD (CORONARY ARTERY DISEASE): Primary | ICD-10-CM

## 2024-01-09 DIAGNOSIS — R94.31 ABNORMAL EKG: ICD-10-CM

## 2024-01-09 DIAGNOSIS — Z01.810 PREOP CARDIOVASCULAR EXAM: ICD-10-CM

## 2024-01-09 DIAGNOSIS — R94.31 ABNORMAL EKG: Primary | ICD-10-CM

## 2024-01-09 DIAGNOSIS — Z01.818 PRE-OP EVALUATION: ICD-10-CM

## 2024-01-09 PROCEDURE — 93017 CV STRESS TEST TRACING ONLY: CPT

## 2024-01-09 PROCEDURE — 78452 HT MUSCLE IMAGE SPECT MULT: CPT

## 2024-01-09 PROCEDURE — 93016 CV STRESS TEST SUPVJ ONLY: CPT | Performed by: INTERNAL MEDICINE

## 2024-01-09 PROCEDURE — 2500000004 HC RX 250 GENERAL PHARMACY W/ HCPCS (ALT 636 FOR OP/ED): Performed by: INTERNAL MEDICINE

## 2024-01-09 PROCEDURE — 78452 HT MUSCLE IMAGE SPECT MULT: CPT | Performed by: INTERNAL MEDICINE

## 2024-01-09 RX ORDER — REGADENOSON 0.08 MG/ML
0.4 INJECTION, SOLUTION INTRAVENOUS ONCE
Status: COMPLETED | OUTPATIENT
Start: 2024-01-09 | End: 2024-01-09

## 2024-01-09 RX ADMIN — REGADENOSON 0.4 MG: 0.08 INJECTION, SOLUTION INTRAVENOUS at 11:16

## 2024-01-10 LAB
LAB AP ASR DISCLAIMER: NORMAL
LABORATORY COMMENT REPORT: NORMAL
PATH REPORT.FINAL DX SPEC: NORMAL
PATH REPORT.GROSS SPEC: NORMAL
PATH REPORT.RELEVANT HX SPEC: NORMAL
PATH REPORT.TOTAL CANCER: NORMAL

## 2024-01-17 DIAGNOSIS — E55.9 VITAMIN D DEFICIENCY: ICD-10-CM

## 2024-01-17 RX ORDER — ERGOCALCIFEROL 1.25 MG/1
1.25 CAPSULE ORAL 2 TIMES WEEKLY
Qty: 16 CAPSULE | Refills: 0 | Status: SHIPPED | OUTPATIENT
Start: 2024-01-18 | End: 2024-03-14

## 2024-01-18 ENCOUNTER — TELEPHONE (OUTPATIENT)
Dept: SURGERY | Facility: CLINIC | Age: 44
End: 2024-01-18
Payer: COMMERCIAL

## 2024-01-18 NOTE — TELEPHONE ENCOUNTER
Left message for pharmacy alerting them that the insurance has approved the ergocalcierfol prescription

## 2024-01-30 DIAGNOSIS — M25.551 PAIN OF RIGHT HIP: ICD-10-CM

## 2024-01-30 DIAGNOSIS — I10 HYPERTENSION, UNSPECIFIED TYPE: ICD-10-CM

## 2024-01-30 RX ORDER — MELOXICAM 15 MG/1
15 TABLET ORAL DAILY
Qty: 30 TABLET | Refills: 1 | Status: SHIPPED | OUTPATIENT
Start: 2024-01-30

## 2024-01-30 RX ORDER — METOPROLOL TARTRATE AND HYDROCHLOROTHIAZIDE 100; 25 MG/1; MG/1
1 TABLET ORAL DAILY
Qty: 90 TABLET | Refills: 1 | Status: SHIPPED | OUTPATIENT
Start: 2024-01-30

## 2024-02-26 ENCOUNTER — OFFICE VISIT (OUTPATIENT)
Dept: BEHAVIORAL HEALTH | Facility: CLINIC | Age: 44
End: 2024-02-26
Payer: COMMERCIAL

## 2024-02-26 DIAGNOSIS — E66.01 CLASS 3 SEVERE OBESITY DUE TO EXCESS CALORIES WITH BODY MASS INDEX (BMI) OF 45.0 TO 49.9 IN ADULT, UNSPECIFIED WHETHER SERIOUS COMORBIDITY PRESENT (MULTI): ICD-10-CM

## 2024-02-26 DIAGNOSIS — F54 PSYCHOLOGICAL FACTOR AFFECTING PHYSICAL CONDITION: ICD-10-CM

## 2024-02-26 DIAGNOSIS — Z98.84 BARIATRIC SURGERY STATUS: ICD-10-CM

## 2024-02-26 PROCEDURE — 1036F TOBACCO NON-USER: CPT | Performed by: PSYCHOLOGIST

## 2024-02-26 PROCEDURE — 90791 PSYCH DIAGNOSTIC EVALUATION: CPT | Performed by: PSYCHOLOGIST

## 2024-02-26 PROCEDURE — 3008F BODY MASS INDEX DOCD: CPT | Performed by: PSYCHOLOGIST

## 2024-02-26 PROCEDURE — 4010F ACE/ARB THERAPY RXD/TAKEN: CPT | Performed by: PSYCHOLOGIST

## 2024-02-26 NOTE — PROGRESS NOTES
"Start time: 10:00am  End time: 10:45am    Televideo Informed Consent for psychological evaluation was reviewed with the patient as follows:  There are potential benefits and risks of the use of telephone or video-conferencing that differ from in-person sessions. Specifically, the telephone or televideo system we are using may not be HIPAA compliant and may present limits to patient confidentiality. Confidentiality still applies for telepsychology services, and nobody will record the session without your permission.     Understanding and verbal agreement was attested to by the patient. Patient identity was confirmed using 3 sources, including telephone number, email address and date of birth. Provision of services via telehealth was necessitated by the restrictions on face-to-face visits accompanying the COVID-19 pandemic.    Non-secure Note: The patient has consented to a nonrestricted note.    I had the pleasure of seeing REGLA BELLA at your request for behavioral evaluation for appropriateness for bariatric surgery. As you know, MR. BELLA is a 43 year old who reports a current weight of 381 pounds and a BMI of approximately 49.    MR. BELLA reported that he is almost done with this clearances. He is working on using his CPAP nightly and will have clearance for this shortly.     WEIGHT HISTORY  MR. BELLA reported that he has struggled with weight since childhood. In high school he stated that he played football and was very active.     He stated that about 15 years ago he attempted Weight Watchers and lost about 30 lbs. After stopping this he noted that he gained the weight back. He reported that lifestyle impacts his weight most. He has reduced his intake of candy bars and snacking at night.     MR. BELLA reported that he has one more appointment with his dietitian. He described his changing of habits as \"tough.\" He is practicing 30-30-30, slowing down when he eats, eating smaller portions and is working on eating " three meals per day. He stated that reducing his starch intake has been challenging. He has reduced his caffeine and pop intake and is only having one pop per week. He stated that he does not feel this will be an issue to stop. He reported that he has been attempting to increase vegetables.     Daily food intake:  Breakfast/lunch (12pm): Two hamburger patties and a baked potato  Dinner: Two hamburger patties and a baked potato  Snacks: two pineapple cups     PSYCHOSOCIAL HISTORY  MR. BELLA lives at home with his wife and two children. He described his wife as his greatest support. She stated that she is supportive of his decision to have surgery.     MR. BELLA stated that he walks at work daily. He reported that he has a membership at the [employer] but has not been in the last remember.     MR. BELLA reported that he drinks alcohol maybe once per week. He denies use of tobacco/nicotine products and other substances.     PSYCHOLOGICAL STATUS  MR. BELLA denies history of mental health related issues. He reported that he has not been in therapy. He denies SI and history of suicide attempts. He reported that when he was younger he struggled with anger and irritability. He currently take Cymbalta for irritability and anger management. He reported that he lives a very structured and routine based lifestyle.     MR. BELLA stated that he drives when he is feeling stressed in order to find calm.     He stated that boredom has impacted his eating. He has started to limit his night time eating and is working on being mindful of his hunger cues. He has been engaging in overeating 2-3 times per week. He denies restrictive behaviors, excessive exercise, purging behaviors, binge eating behaviors.     Behavioral issues relevant for candidacy for bariatric surgery include:    1) Motivation: MR. BELLA is motivated by a desire to improve his health in order to limit risk associated with serious illness. He stated that he wants to continue to be  independent and able to take care of others.     2) History of compliance: MR. BELLA reports no history of problems with compliance with medication regimens. He stated that he uses his CPAP nightly.    3) History of attempts to lose weight: MR. BELLA has tried and failed at more conservative approaches to weight loss. He described some success with Weight Watchers in the past.     4) Coping resources and social support: MR. BELLA reports significant support from family in his pursuit of bariatric surgery. He described adequate coping for stress and mood management.     5) Ability to maintain behavior post-surgery: In my opinion, MR. BELLA is prepared to handle the behavioral demands that are consequent to bariatric surgery. He stated that he has made the following changes: practicing 30-30-30, increasing protein, increasing vegetables, meal prepping, eating more slowly, and practicing portion control. He noted that he has made progress in reducing overeating behaviors. He noted that he has not been exercising as much as he would like and is working on making this a priority. He reported that he has significantly reduced his caffeine and pop intake.     6) Psychological contraindications to surgery: A comprehensive review of psychological symptoms reveals no contraindications to surgery. He reported that he is on Cymbalta for irritability and anger management. He noted that he has been on this for years and described his mood as stable. He denies symptoms related to anxiety, depression, and delfino/hypomania. He reported that he has good coping for stress management.    We discussed concerns related to night time eating and over eating. He stated that he has significantly reduced this behavior with structuring his meals and the support of his wife. However, he agreed to follow-up with provider to discuss alternative coping and behavior change support. He denies disordered eating behaviors.     IMPRESSIONS    MR. BELLA is  able to provide informed consent and is an appropriate candidate for bariatric surgery from the psychological perspective.     Behavioral confirmation of his candidacy will come in the form of his ability to adhere to his current dietary plan, increase exercise and continue to reduce behaviors related to over-eating. Referral was provided for counseling. Should he fare poorly with this adherence trial, please consider recommending a follow-up visit with this office.     Additionally, we had an extended discussion about behavioral responses to surgery for which he should be vigilant. We discussed the post-surgical risks of mood deterioration, substance misuse, the development of compulsive behaviors and the development of maladaptive coping responses. He expressed understanding of these risks and agreed to contact our office with appropriate haste if any of these maladaptive responses were to develop after surgery.    Finally, we discussed the importance of his pursuit of psychotherapy to address some of the issues that can contribute to his emotional eating (e.g., bored eating and night eating) and to help him sustain healthy habits following bariatric surgery. Referral to [facility] was submitted.     Thank you for allowing me to collaborate in the evaluation of your patient. Please feel free to contact me if I can provide any additional information.      Mental Status Examination:    Mental Status Exam:  Orientation:  Alert. Oriented x3.  Memory: intact.  Attention/Concentration: Normal/ Good.  Appearance:  Well-groomed. Casually Dressed. Good hygiene.   Behavior/Attitude: Cooperative. Pleasant. Good eye contact.  Motor: Relaxed. Calm. Normal motor activity.   Speech: Regular rate and volume. Fluent. No pressure.   Mood: Euthymic  Affect: Congruent to stated mood.   Thought process: Goal-directed. Organized.  Thought content: No paranoia, delusion or ideas of reference. No AVH   Suicidal ideation:  denied.  Homicidal ideation: denied.   Insight: Good  Judgment: Good  Fund of knowledge: Above Average      Cristine Alexander Psy.D.  Pronouns - she  her  hers  Licensed Clinical Psychologist    [facility]   [facility]  Phone: [phone]  [email or web address]

## 2024-02-26 NOTE — LETTER
February 26, 2024     Tiny Wheeler MD  [id number] [address]  [facility] [address]    Patient: Ramirez Rodriguez   YOB: 1980   Date of Visit: 2/26/2024       Dear Dr. Tiny Wheeler MD:    Thank you for referring Ramirez Rodriguez to me for evaluation. Below are my notes for this consultation.  If you have questions, please do not hesitate to call me. I look forward to following your patient along with you.       Sincerely,     Cristine Alexander PsyD      CC: No Recipients  ______________________________________________________________________________________    Start time: 10:00am  End time: 10:45am    Televideo Informed Consent for psychological evaluation was reviewed with the patient as follows:  There are potential benefits and risks of the use of telephone or video-conferencing that differ from in-person sessions. Specifically, the telephone or televideo system we are using may not be HIPAA compliant and may present limits to patient confidentiality. Confidentiality still applies for telepsychology services, and nobody will record the session without your permission.     Understanding and verbal agreement was attested to by the patient. Patient identity was confirmed using 3 sources, including telephone number, email address and date of birth. Provision of services via telehealth was necessitated by the restrictions on face-to-face visits accompanying the COVID-19 pandemic.    Non-secure Note: The patient has consented to a nonrestricted note.    I had the pleasure of seeing RAMIREZ RODRIGUEZ at your request for behavioral evaluation for appropriateness for bariatric surgery. As you know, MR. RODRIGUEZ is a 43 year old who reports a current weight of 381 pounds and a BMI of approximately 49.    MR. RODRIGUEZ reported that he is almost done with this clearances. He is working on using his CPAP nightly and will have clearance for this shortly.     WEIGHT HISTORY  MR. RODRIGUEZ reported that he  "has struggled with weight since childhood. In high school he stated that he played football and was very active.     He stated that about 15 years ago he attempted Weight Watchers and lost about 30 lbs. After stopping this he noted that he gained the weight back. He reported that lifestyle impacts his weight most. He has reduced his intake of candy bars and snacking at night.     MR. BELLA reported that he has one more appointment with his dietitian. He described his changing of habits as \"tough.\" He is practicing 30-30-30, slowing down when he eats, eating smaller portions and is working on eating three meals per day. He stated that reducing his starch intake has been challenging. He has reduced his caffeine and pop intake and is only having one pop per week. He stated that he does not feel this will be an issue to stop. He reported that he has been attempting to increase vegetables.     Daily food intake:  Breakfast/lunch (12pm): Two hamburger patties and a baked potato  Dinner: Two hamburger patties and a baked potato  Snacks: two pineapple cups     PSYCHOSOCIAL HISTORY  MR. BELLA lives at home with his wife and two children. He described his wife as his greatest support. She stated that she is supportive of his decision to have surgery.     MR. BELLA stated that he walks at work daily. He reported that he has a membership at the [employer] but has not been in the last remember.     MR. BELLA reported that he drinks alcohol maybe once per week. He denies use of tobacco/nicotine products and other substances.     PSYCHOLOGICAL STATUS  MR. BELLA denies history of mental health related issues. He reported that he has not been in therapy. He denies SI and history of suicide attempts. He reported that when he was younger he struggled with anger and irritability. He currently take Cymbalta for irritability and anger management. He reported that he lives a very structured and routine based lifestyle.     MR. BELLA stated that he " drives when he is feeling stressed in order to find calm.     He stated that boredom has impacted his eating. He has started to limit his night time eating and is working on being mindful of his hunger cues. He has been engaging in overeating 2-3 times per week. He denies restrictive behaviors, excessive exercise, purging behaviors, binge eating behaviors.     Behavioral issues relevant for candidacy for bariatric surgery include:    1) Motivation: MR. BELLA is motivated by a desire to improve his health in order to limit risk associated with serious illness. He stated that he wants to continue to be independent and able to take care of others.     2) History of compliance: MR. BELLA reports no history of problems with compliance with medication regimens. He stated that he uses his CPAP nightly.    3) History of attempts to lose weight: MR. BELLA has tried and failed at more conservative approaches to weight loss. He described some success with Weight Watchers in the past.     4) Coping resources and social support: MR. BELLA reports significant support from family in his pursuit of bariatric surgery. He described adequate coping for stress and mood management.     5) Ability to maintain behavior post-surgery: In my opinion, MR. BELLA is prepared to handle the behavioral demands that are consequent to bariatric surgery. He stated that he has made the following changes: practicing 30-30-30, increasing protein, increasing vegetables, meal prepping, eating more slowly, and practicing portion control. He noted that he has made progress in reducing overeating behaviors. He noted that he has not been exercising as much as he would like and is working on making this a priority. He reported that he has significantly reduced his caffeine and pop intake.     6) Psychological contraindications to surgery: A comprehensive review of psychological symptoms reveals no contraindications to surgery. He reported that he is on Cymbalta for  irritability and anger management. He noted that he has been on this for years and described his mood as stable. He denies symptoms related to anxiety, depression, and delfino/hypomania. He reported that he has good coping for stress management.    We discussed concerns related to night time eating and over eating. He stated that he has significantly reduced this behavior with structuring his meals and the support of his wife. However, he agreed to follow-up with provider to discuss alternative coping and behavior change support. He denies disordered eating behaviors.     IMPRESSIONS    MR. BELLA is able to provide informed consent and is an appropriate candidate for bariatric surgery from the psychological perspective.     Behavioral confirmation of his candidacy will come in the form of his ability to adhere to his current dietary plan, increase exercise and continue to reduce behaviors related to over-eating. Referral was provided for counseling. Should he fare poorly with this adherence trial, please consider recommending a follow-up visit with this office.     Additionally, we had an extended discussion about behavioral responses to surgery for which he should be vigilant. We discussed the post-surgical risks of mood deterioration, substance misuse, the development of compulsive behaviors and the development of maladaptive coping responses. He expressed understanding of these risks and agreed to contact our office with appropriate haste if any of these maladaptive responses were to develop after surgery.    Finally, we discussed the importance of his pursuit of psychotherapy to address some of the issues that can contribute to his emotional eating (e.g., bored eating and night eating) and to help him sustain healthy habits following bariatric surgery. Referral to [facility] was submitted.     Thank you for allowing me to collaborate in the evaluation of your patient. Please feel free to contact me  if I can provide any additional information.      Mental Status Examination:    Mental Status Exam:  Orientation:  Alert. Oriented x3.  Memory: intact.  Attention/Concentration: Normal/ Good.  Appearance:  Well-groomed. Casually Dressed. Good hygiene.   Behavior/Attitude: Cooperative. Pleasant. Good eye contact.  Motor: Relaxed. Calm. Normal motor activity.   Speech: Regular rate and volume. Fluent. No pressure.   Mood: Euthymic  Affect: Congruent to stated mood.   Thought process: Goal-directed. Organized.  Thought content: No paranoia, delusion or ideas of reference. No AVH   Suicidal ideation: denied.  Homicidal ideation: denied.   Insight: Good  Judgment: Good  Fund of knowledge: Above Average      Cristine Alexander Psy.D.  Pronouns - she  her  hers  Licensed Clinical Psychologist    [facility] [place]   [facility]  Phone: [phone]  [email or web address]

## 2024-04-01 ENCOUNTER — TELEPHONE (OUTPATIENT)
Dept: PRIMARY CARE | Facility: CLINIC | Age: 44
End: 2024-04-01
Payer: COMMERCIAL

## 2024-04-03 ENCOUNTER — TELEPHONE (OUTPATIENT)
Dept: SURGERY | Facility: CLINIC | Age: 44
End: 2024-04-03
Payer: COMMERCIAL

## 2024-04-03 ENCOUNTER — APPOINTMENT (OUTPATIENT)
Dept: SURGERY | Facility: CLINIC | Age: 44
End: 2024-04-03
Payer: COMMERCIAL

## 2024-04-03 NOTE — TELEPHONE ENCOUNTER
Patient canceled follow up with RD today.  Attempted to reach patient to reschedule, no answer.  Left message for patient that he has been rescheduled to 4/16/24 at 11:00am for a virtual visit.  Provided the main line to our office to call back if this date/time does not work.

## 2024-04-16 ENCOUNTER — NUTRITION (OUTPATIENT)
Dept: SURGERY | Facility: CLINIC | Age: 44
End: 2024-04-16
Payer: COMMERCIAL

## 2024-04-16 VITALS — HEIGHT: 72 IN | WEIGHT: 315 LBS | BODY MASS INDEX: 42.66 KG/M2

## 2024-04-16 NOTE — PROGRESS NOTES
PREOPERATIVE, MULTIDISCIPLINARY, MEDICALLY SUPERVISED, REDUCED CALORIE DIET, BEHAVIOR MODIFICATION AND EXERCISE PROGRAM    S:    Pt states that he has stopped eating ice cream and junk food.  Pt is eating 3 meals per day that usually have protein.  He is drinking >64 oz water daily.   He is practicing the 30-30-30 rule.  He is eating slowly.  He is chewing his food well.      Usual intake:  B:   2-3 pork sausage patties, 2-3 eggs or 1 packet of oatmeal w/sugar  S;   hummus and carrots or celery and ranch  L;   HMR meals 14 g pro     S;   none  D;    beef/pork/chicken, veggies and starch (rice, corn, peas or potato)  S;         O:    Wt:  385.0     Ht:              BMI: .bmi    Goal: 5% body weight loss over the course of program    Dietary recommendation:   1. Measure and record your intake daily.  Aim for about 1700  calories daily.   2. Continue to practice the 30-30-30 rule by drinking between meals.  3. Structure your meal plan - have 3 meals and 1 snack daily.  4. Have balanced meals that always contain a good source of protein. Have at least 4 oz of protein per meal.  Weigh your proteins after cooking.  Choose lean proteins.    5. Increase intake of non-starchy vegetables.  Have 5 servings fruits and vegetables daily.   6. Go to the gym in the morning for 45 min 3x/week. Increase physical activity by 10-15 minutes to an end goal of 60 minutes 5 x per week.    A/P:   Pt has gained almost 10 pounds since his initial visit.  He will work on making some adjustments and start going to the gym to help him lose weight.  He will follow up with [facility] in 1 month.     Exercise:  walking 10,000 steps at work but no planned exercise,     Karis Kirk RD, LD

## 2024-04-26 ENCOUNTER — TELEMEDICINE CLINICAL SUPPORT (OUTPATIENT)
Dept: SURGERY | Facility: CLINIC | Age: 44
End: 2024-04-26
Payer: COMMERCIAL

## 2024-05-11 DIAGNOSIS — E11.9 TYPE 2 DIABETES MELLITUS WITHOUT COMPLICATION, WITHOUT LONG-TERM CURRENT USE OF INSULIN (MULTI): ICD-10-CM

## 2024-05-13 RX ORDER — DULAGLUTIDE 3 MG/.5ML
3 INJECTION, SOLUTION SUBCUTANEOUS
Qty: 2 ML | Refills: 1 | Status: SHIPPED | OUTPATIENT
Start: 2024-05-19

## 2024-05-21 ENCOUNTER — NUTRITION (OUTPATIENT)
Dept: SURGERY | Facility: CLINIC | Age: 44
End: 2024-05-21
Payer: COMMERCIAL

## 2024-05-21 VITALS — HEIGHT: 72 IN | WEIGHT: 315 LBS | BODY MASS INDEX: 42.66 KG/M2

## 2024-05-21 DIAGNOSIS — F43.20 ADJUSTMENT DISORDER, UNSPECIFIED TYPE: ICD-10-CM

## 2024-05-21 NOTE — PROGRESS NOTES
PREOPERATIVE, MULTIDISCIPLINARY, MEDICALLY SUPERVISED, REDUCED CALORIE DIET, BEHAVIOR MODIFICATION AND EXERCISE PROGRAM    S:  Pt states he has stopped drinking pop and snacking at night.   He is drinking >64 oz water daily.  He has stopped drinking coffee.  Sometimes he has [employer].  He is practicing the 30-30-30 rule.  He takes about 20-30 min to complete a meal.  He is chewing well.     Usual intake  B protein shake  S; string cheese  L; shake  or protein/veggies, some sometimes a carb  D: protein/veggies, sometimes a  carb    O:    Wt:  375.9     Ht:     72.0          BMI: 50.98    Goal: 5% body weight loss over the course of program    Dietary recommendation:   1. Continue to practice the 30-30-30 rule by drinking between meals.  2 Continue to follow your meal plan.   3.  Increase intake of non-starchy vegetables.  Have 5 servings fruits and vegetables daily.   4  Try to find some time for exercise.   5.  Work on 5-8 pound weight loss by  6/24    A/P:    Pt is doing better following his meal plan.  From a nutritional standpoint, he is cleared for surgery.  He will continue to work on weight loss and follow up with RD in 1 month.     Exercise:  no planned exercise, working a lot of double shifts,   Karis Kirk RD, LD

## 2024-05-22 ENCOUNTER — DOCUMENTATION (OUTPATIENT)
Dept: SURGERY | Facility: CLINIC | Age: 44
End: 2024-05-22
Payer: COMMERCIAL

## 2024-05-22 NOTE — PROGRESS NOTES
"Letter of Medical Necessity    24      ATTN: Pre-Authorization  Department           Tentative Surgery Date:  24    RE: Ramirez Rodriguez    : 1980         BMI: 50.98    Weight: 375lb      Height:  6'0\"    Mr. Ramirez Rodriguez suffers from multiple health conditions including Morbid Obesity (E66.01). An extensive clinical evaluation has been completed and the final recommendation has been provided to Mr. Rodriguez; explanation of short and long term surgical risks vs. benefits has been reviewed at length. Mr. Rodriguez has verbalized an understanding of the associated risks, has agreed to comply with behavioral and lifestyle changes that support a successful post-surgical outcome, resolution of comorbid conditions and improvement in quality of life.    The patient suffers from the following health conditions:   Patient Active Problem List   Diagnosis    Adjustment disorder, unspecified    Hypertension    LYNDSEY treated with BiPAP    Type 2 diabetes mellitus without complication, without long-term current use of insulin (Multi)    Shortness of breath on exertion    Cough in adult    Hip pain, right    Type 2 diabetes mellitus with hyperglycemia (Multi)    Hyperlipidemia, mild    Obesity, morbid, BMI 40.0-49.9 (Multi)    Abnormal EKG    Pre-op evaluation         We kindly request careful review of the following documents: Surgeon's consultation with weight related comorbidities and diagnoses, primary care support letter, psychiatric evaluation,  nutrition assessment and other pertinent information required by the member's plan.      Mr. Rodriguez has attended our pre-operative educational programs and verbalizes that he has an understanding of the behaviors and choices necessary to be successful with bariatric surgery for a lifetime. Mr. Rodriguez further understands that in order to be successful he must attend post-operative visits at 1week, 6 weeks, 3 months, 6 months, 12 months, and annually to review him progress and consult " with our registered dietitian. In addition Mr. Rodriguez agrees to attend monthly support group meetings hosted by our licensed program staff to further enhance him chances of successful lifetime weight loss. he has agreed to participate in exercise 5 days per week as tolerated and has already been advised  to increase him physical activity in preparation for surgery.              We kindly request review of prior-authorization for a 1 to 2 day hospital stay for procedure Laparoscopic mikel en y gastric bypass [id number] with Dr. Tiny Wheeler  NPI:[id number]  TID: [id number] at [facility]. Tentative surgery date 7/31/24.    Please fax your approval or requests for  additional information to the attention of Julia Bloom, Patient Navigator at [phone], this is a secured fax line.    We sincerely appreciate your thoughtful review of this case.      Sincerely,  Dr. Tiny Wheeler  [facility]    Enclosures

## 2024-05-23 RX ORDER — DULOXETIN HYDROCHLORIDE 60 MG/1
60 CAPSULE, DELAYED RELEASE ORAL DAILY
Qty: 90 CAPSULE | Refills: 1 | Status: SHIPPED | OUTPATIENT
Start: 2024-05-23

## 2024-05-31 ENCOUNTER — TELEPHONE (OUTPATIENT)
Dept: SURGERY | Facility: CLINIC | Age: 44
End: 2024-05-31
Payer: COMMERCIAL

## 2024-05-31 NOTE — TELEPHONE ENCOUNTER
Outgoing call made to patient. Spoke to patient, told patient that per the insurance company they are asking for a new PCP Support letter to complete as PCP stated that everything was uncontrolled and not getting any assistance.  Asked pt if he's seeing pcp for help w/diabetes stated yes, told him that I will send a message through [employer] with a blank pcp letter and have dr complete and resubmit.   Told patient case will pend until she receives addtl info.   Which will include PCP compliance, Weight for a year and new PCP Support letter.

## 2024-06-20 ENCOUNTER — APPOINTMENT (OUTPATIENT)
Dept: PRIMARY CARE | Facility: CLINIC | Age: 44
End: 2024-06-20
Payer: COMMERCIAL

## 2024-06-20 PROBLEM — R05.9 COUGH IN ADULT: Status: RESOLVED | Noted: 2023-05-16 | Resolved: 2024-06-20

## 2024-06-24 ENCOUNTER — APPOINTMENT (OUTPATIENT)
Dept: SURGERY | Facility: CLINIC | Age: 44
End: 2024-06-24
Payer: COMMERCIAL

## 2024-06-27 ENCOUNTER — TELEPHONE (OUTPATIENT)
Dept: SURGERY | Facility: CLINIC | Age: 44
End: 2024-06-27
Payer: COMMERCIAL

## 2024-06-27 ENCOUNTER — TELEPHONE (OUTPATIENT)
Dept: PRIMARY CARE | Facility: CLINIC | Age: 44
End: 2024-06-27
Payer: COMMERCIAL

## 2024-06-27 NOTE — TELEPHONE ENCOUNTER
Called dr maynard's office spoke w/issa exp to her that I received LTR of support complete by  but not all the way completed. If I can send over form will doctor be able to review again.    Asked me to fax it over to [phone]

## 2024-06-27 NOTE — TELEPHONE ENCOUNTER
Pt had a form completed for bariatric surgery. The portion for if he is a diabetic, if it's controlled, if he is on medication/which one was not filled out. Due to that not being filled out the insurance denied it. Please fill it out and faxed back.

## 2024-06-28 ENCOUNTER — DOCUMENTATION (OUTPATIENT)
Dept: SURGERY | Facility: CLINIC | Age: 44
End: 2024-06-28
Payer: COMMERCIAL

## 2024-07-16 ENCOUNTER — TELEPHONE (OUTPATIENT)
Dept: SURGERY | Facility: CLINIC | Age: 44
End: 2024-07-16
Payer: COMMERCIAL

## 2024-07-16 NOTE — TELEPHONE ENCOUNTER
[employer] called and told me that I will need to fax entire case over with both auth. Explained auth #1 was denied as verifs were not provided and auth# 2 was approved but CPT is incorrect.     Fax all info to [phone]

## 2024-07-16 NOTE — TELEPHONE ENCOUNTER
Outgoing call ([phone]) made to Toshia Velez (senior clinical services liasion) for [employer].    Left vm asking her for her assistance.    I received approval letter for patient with wrong CPT code but it has right service description.

## 2024-07-16 NOTE — TELEPHONE ENCOUNTER
[place] from [facility] called ([phone]) wanted to confirm that we are good to go.  Approved from 7-31 to 8-1 cpt 93625 use case# [id number]

## 2024-07-16 NOTE — TELEPHONE ENCOUNTER
Lamar called with [employer] and told me not to fax info over that she spoke with her manager and they told her that they will [place] the [place] [address] onto the approval letter and will make sure that we use case# [id number].    If anyone has any questions we can call her directly at [phone]

## 2024-07-17 ENCOUNTER — TELEPHONE (OUTPATIENT)
Dept: SURGERY | Facility: CLINIC | Age: 44
End: 2024-07-17
Payer: COMMERCIAL

## 2024-07-18 ENCOUNTER — APPOINTMENT (OUTPATIENT)
Dept: PRIMARY CARE | Facility: CLINIC | Age: 44
End: 2024-07-18
Payer: COMMERCIAL

## 2024-07-22 ENCOUNTER — TELEPHONE (OUTPATIENT)
Dept: SURGERY | Facility: CLINIC | Age: 44
End: 2024-07-22
Payer: COMMERCIAL

## 2024-07-22 DIAGNOSIS — Z98.84 BARIATRIC SURGERY STATUS: Primary | ICD-10-CM

## 2024-07-22 DIAGNOSIS — E66.01 MORBID OBESITY (MULTI): ICD-10-CM

## 2024-07-22 DIAGNOSIS — Z01.818 PREOPERATIVE CLEARANCE: ICD-10-CM

## 2024-07-23 ENCOUNTER — TELEPHONE (OUTPATIENT)
Dept: SURGERY | Facility: CLINIC | Age: 44
End: 2024-07-23
Payer: COMMERCIAL

## 2024-07-23 PROBLEM — E66.01 MORBID OBESITY (MULTI): Status: ACTIVE | Noted: 2024-07-22

## 2024-07-23 NOTE — TELEPHONE ENCOUNTER
Pt called to review the preop diet.    He will start it, tomorrow July 24.   Pt has all of the chewable supplements for after surgery.   Emailed NG booklet, preop diet and sample meal plan yesterday.    Karis Kirk RD, LD

## 2024-07-24 ENCOUNTER — TELEPHONE (OUTPATIENT)
Dept: SURGERY | Facility: CLINIC | Age: 44
End: 2024-07-24
Payer: COMMERCIAL

## 2024-07-24 ENCOUNTER — DOCUMENTATION (OUTPATIENT)
Dept: SURGERY | Facility: CLINIC | Age: 44
End: 2024-07-24
Payer: COMMERCIAL

## 2024-07-24 PROBLEM — Z98.84 BARIATRIC SURGERY STATUS: Status: ACTIVE | Noted: 2024-07-24

## 2024-07-24 PROBLEM — E66.01 OBESITY, MORBID, BMI 40.0-49.9 (MULTI): Status: RESOLVED | Noted: 2023-10-30 | Resolved: 2024-07-24

## 2024-07-24 NOTE — PROGRESS NOTES
BARIATRIC SURGERY PREOPERATIVE VISIT    Date: 08/01/24  Time: [unfilled]    Name: Ramirez Rodriguez    MRN: [id number]    This is a 43 y.o. y.o. male with morbid obesity (Body mass index is 46.99 kg/m².) who plans to undergo Laparoscopic mikel en y gastric bypass 72270 surgery. They have completed a rigorous preoperative medical work-up and bariatric surgery educational program.     PMH:   Patient Active Problem List   Diagnosis    Adjustment disorder, unspecified    Hypertension    LYNDSEY treated with BiPAP    Type 2 diabetes mellitus without complication, without long-term current use of insulin (Multi)    Shortness of breath on exertion    Hip pain, right    Type 2 diabetes mellitus with hyperglycemia (Multi)    Hyperlipidemia, mild    Abnormal EKG    Pre-op evaluation    Obesity, morbid, BMI 50 or higher (Multi)    Bariatric surgery status       PSH:   Past Surgical History:   Procedure Laterality Date    APPENDECTOMY  12/04/2023    SHOULDER Right 2012       Social hx:   Social History     Socioeconomic History    Marital status:      Spouse name: Not on file    Number of children: Not on file    Years of education: Not on file    Highest education level: Not on file   Occupational History    Not on file   Tobacco Use    Smoking status: Never    Smokeless tobacco: Never   Vaping Use    Vaping status: Never Used   Substance and Sexual Activity    Alcohol use: Not Currently     Comment: Socially    Drug use: Never    Sexual activity: Not Currently   Other Topics Concern    Not on file   Social History Narrative    Not on file     Social Determinants of Health     Financial Resource Strain: Not on file   Food Insecurity: Not on file   Transportation Needs: Not on file   Physical Activity: Not on file   Stress: Not on file   Social Connections: Not on file   Intimate Partner Violence: Not on file   Housing Stability: Not on file       Initial weight: 376  Current weight:   Vitals:    08/01/24 0923   Weight: (!) 166 kg  (366 lb)       Preop Clearances:  Cardiac: Cleared  Pulmonary: Cleared  Psych: Cleared    History of Clotting Disorder: N/A  Anticoagulation plan: Lovenox 60mg daily per BMI protocol     Sleep Study: Severe apnea and Compliant with CPAP: 97%    Endoscopy 1/3/24  Impression  The esophagus, stomach, duodenal bulb, 1st part of the duodenum and 2nd part of the duodenum appeared normal.  Performed forceps biopsies in the prepyloric region to rule out H. pylori        Findings  The esophagus, stomach, duodenal bulb, 1st part of the duodenum and 2nd part of the duodenum appeared normal.  Performed forceps biopsies in the prepyloric region to rule out H. pylori  Regular Z-line 42 cm from the incisors  Hill Grade Flap Valve Classification: Hill Grade 3 (loose, likely hiatal hernia)   Procedure Images                          Preadmission testing date: today    MEDICATIONS:  Prior to Admission Medications:    Current Outpatient Medications:     albuterol 90 mcg/actuation inhaler, INHALE 2 PUFFS BY MOUTH PRIOR TO EXERTION/ EXERCISE AND EVERY 4 TO 6 HOURS AS NEEDED FOR COUGH OR SHORTNESS OF BREATH, Disp: , Rfl:     chlorhexidine (Hibiclens) 4 % external liquid, Apply topically once daily for 5 days. Wash daily for 5 days prior to surgery with day 5 being morning of surgery., Disp: 118 mL, Rfl: 0    dilTIAZem CD (Cardizem CD) 240 mg 24 hr capsule, Take 1 capsule (240 mg) by mouth once daily., Disp: 90 capsule, Rfl: 0    dulaglutide (Trulicity) 3 mg/0.5 mL pen injector, Inject 3 mg under the skin 1 (one) time per week., Disp: 2 mL, Rfl: 1    DULoxetine (Cymbalta) 60 mg DR capsule, Take 1 capsule (60 mg) by mouth once daily., Disp: 90 capsule, Rfl: 1    glipiZIDE (Glucotrol) 5 mg tablet, Take 1 tablet (5 mg) by mouth 2 times a day before meals., Disp: 60 tablet, Rfl: 5    lisinopril 20 mg tablet, Take 1 tablet (20 mg) by mouth once daily., Disp: 30 tablet, Rfl: 5    meloxicam (Mobic) 15 mg tablet, TAKE 1 TABLET BY MOUTH ONCE  DAILY, Disp: 30 tablet, Rfl: 1    metFORMIN (Glucophage) 1,000 mg tablet, Take 1 tablet (1,000 mg) by mouth 2 times a day., Disp: 60 tablet, Rfl: 5    metoprolol ta-hydrochlorothiaz (Lopressor HCT) 100-25 mg tablet, TAKE 1 TABLET BY MOUTH ONCE DAILY, Disp: 90 tablet, Rfl: 1    Current Facility-Administered Medications:     Tc-99m tetrofosmin (Myoview) injection 12.7 millicurie, 12.7 millicurie, intravenous, Once in imaging, Eden Mccormack MD    Tc-99m tetrofosmin (Myoview) injection 35.7 millicurie, 35.7 millicurie, intravenous, Once in imaging, Eden Mccormack MD    ALLERGIES:  Allergies   Allergen Reactions    Penicillins Anaphylaxis     STATES THROAT CLOSES       REVIEW OF SYSTEMS:  GENERAL: Obese. Negative for malaise, significant weight loss and fever  NECK: Negative for lumps, goiter, pain and significant neck swelling  RESPIRATORY: Negative for cough, wheezing or shortness of breath.  CARDIOVASCULAR: Negative for chest pain, leg swelling or palpitations.  GI: Negative for abdominal discomfort, blood in stools or black stools or change in bowel habits  : No history of dysuria, frequency or incontinence  MUSCULOSKELETAL: Negative for joint pain or swelling, back pain or muscle pain.  SKIN: Negative for lesions, rash, and itching.  PSYCH: Negative for sleep disturbance, mood disorder and recent psychosocial stressors.  ENDOCRINE: Negative for cold or heat intolerance, polyuria, polydipsia and goiter.    PHYSICAL EXAM:  Visit Vitals  /83 (BP Location: Left arm, Patient Position: Sitting, BP Cuff Size: Large adult long)   Pulse 67       General appearance: obese  Skin: warm, no erythema or rashes  Lungs: clear to percussion and auscultation  Heart: regular rhythm and S1, S2 normal  Abdomen: soft, non-tender, no masses, no organomegaly  Extremities: Normal exam of the extremities. No swelling or pain.    No results found for this or any previous visit (from the past 24 hour(s)).    IMPRESSION:  Ramirez Rodriguez is a  43 y.o. y.o. male with a BMI of Body mass index is 46.99 kg/m²..    They have been preoperatively evaluated and deemed to be an appropriate candidate for bariatric surgery.  Surgery Type: Laparoscopic mikel en y gastric bypass 92095    All testing reviewed.  All clearances contained.    PLAN:    The risks of Laparoscopic mikel en y gastric bypass 05502 surgery including bleeding, leak, wound infection, dehydration, ulcers, internal hernia, ulcer, chronic pain,  DVT/PE, prolonged nausea/vomiting, incomplete resolution of associated medical conditions, reflux, weight regain, vitamin/mineral deficiencies, and death have been explained to the patient and Ramirez Rodriguez has expressed understanding and acceptance of them.    The benefits of the above surgery including weight loss, improvement/resolution of associated medical and mental health conditions, improved mobility, and decreased mortality have been explained the the patient and Ramirez Rodriguez has expressed understanding and acceptance of them.    Operative and blood transfusion consent forms were signed by the patient and witnessed today.    Prescriptions for all required post-operative home medications were sent to the patient's pharmacy today and the patient will pick them up prior to surgery.    Further education was provided on day of surgery instructions and what to expect from the inpatient admission after surgery.    Tiny Wheeler MD   Bariatric and Minimally Invasive General Surgery

## 2024-07-24 NOTE — PATIENT INSTRUCTIONS
You are scheduled for Gastric Bypass with Dr. Wheeler on 8/7/2024.     YOU DO NOT NEED TO DO A BOWEL PREP.  You will be on clear liquids only starting the day prior to surgery. Please refer to your final pre op book/RD instructions.     You will receive a phone call the day prior to surgery with your OR arrival time.  Be sure to read over your Pre-Op book for final preparation for surgery.  Make a shopping list and  your supplements prior to surgery.     Prescriptions for Omeprazole (antacid), Oxycodone (pain), Lovenox (blood clot prevention)  and Ondansetron (anti-Nausea) will be sent to the [facility] and delivered to your bedside during your stay unless you have opted to have us send them to your local pharmacy.     Be sure to take the omeprazole every day for six months starting when you get home from the hospital. Open the capsule and sprinkle over SF applesauce, pudding or yogurt. DO NOT MISS A DOSE.     Call with any questions! [phone] for Camila.

## 2024-07-24 NOTE — H&P (VIEW-ONLY)
BARIATRIC SURGERY PREOPERATIVE VISIT    Date: 08/01/24  Time: [unfilled]    Name: Ramirez Rodriguez    MRN: [id number]    This is a 43 y.o. y.o. male with morbid obesity (Body mass index is 46.99 kg/m².) who plans to undergo Laparoscopic mikel en y gastric bypass 26705 surgery. They have completed a rigorous preoperative medical work-up and bariatric surgery educational program.     PMH:   Patient Active Problem List   Diagnosis    Adjustment disorder, unspecified    Hypertension    LYNDSEY treated with BiPAP    Type 2 diabetes mellitus without complication, without long-term current use of insulin (Multi)    Shortness of breath on exertion    Hip pain, right    Type 2 diabetes mellitus with hyperglycemia (Multi)    Hyperlipidemia, mild    Abnormal EKG    Pre-op evaluation    Obesity, morbid, BMI 50 or higher (Multi)    Bariatric surgery status       PSH:   Past Surgical History:   Procedure Laterality Date    APPENDECTOMY  12/04/2023    SHOULDER Right 2012       Social hx:   Social History     Socioeconomic History    Marital status:      Spouse name: Not on file    Number of children: Not on file    Years of education: Not on file    Highest education level: Not on file   Occupational History    Not on file   Tobacco Use    Smoking status: Never    Smokeless tobacco: Never   Vaping Use    Vaping status: Never Used   Substance and Sexual Activity    Alcohol use: Not Currently     Comment: Socially    Drug use: Never    Sexual activity: Not Currently   Other Topics Concern    Not on file   Social History Narrative    Not on file     Social Determinants of Health     Financial Resource Strain: Not on file   Food Insecurity: Not on file   Transportation Needs: Not on file   Physical Activity: Not on file   Stress: Not on file   Social Connections: Not on file   Intimate Partner Violence: Not on file   Housing Stability: Not on file       Initial weight: 376  Current weight:   Vitals:    08/01/24 0923   Weight: (!) 166 kg  (366 lb)       Preop Clearances:  Cardiac: Cleared  Pulmonary: Cleared  Psych: Cleared    History of Clotting Disorder: N/A  Anticoagulation plan: Lovenox 60mg daily per BMI protocol     Sleep Study: Severe apnea and Compliant with CPAP: 97%    Endoscopy 1/3/24  Impression  The esophagus, stomach, duodenal bulb, 1st part of the duodenum and 2nd part of the duodenum appeared normal.  Performed forceps biopsies in the prepyloric region to rule out H. pylori        Findings  The esophagus, stomach, duodenal bulb, 1st part of the duodenum and 2nd part of the duodenum appeared normal.  Performed forceps biopsies in the prepyloric region to rule out H. pylori  Regular Z-line 42 cm from the incisors  Hill Grade Flap Valve Classification: Hill Grade 3 (loose, likely hiatal hernia)   Procedure Images                          Preadmission testing date: today    MEDICATIONS:  Prior to Admission Medications:    Current Outpatient Medications:     albuterol 90 mcg/actuation inhaler, INHALE 2 PUFFS BY MOUTH PRIOR TO EXERTION/ EXERCISE AND EVERY 4 TO 6 HOURS AS NEEDED FOR COUGH OR SHORTNESS OF BREATH, Disp: , Rfl:     chlorhexidine (Hibiclens) 4 % external liquid, Apply topically once daily for 5 days. Wash daily for 5 days prior to surgery with day 5 being morning of surgery., Disp: 118 mL, Rfl: 0    dilTIAZem CD (Cardizem CD) 240 mg 24 hr capsule, Take 1 capsule (240 mg) by mouth once daily., Disp: 90 capsule, Rfl: 0    dulaglutide (Trulicity) 3 mg/0.5 mL pen injector, Inject 3 mg under the skin 1 (one) time per week., Disp: 2 mL, Rfl: 1    DULoxetine (Cymbalta) 60 mg DR capsule, Take 1 capsule (60 mg) by mouth once daily., Disp: 90 capsule, Rfl: 1    glipiZIDE (Glucotrol) 5 mg tablet, Take 1 tablet (5 mg) by mouth 2 times a day before meals., Disp: 60 tablet, Rfl: 5    lisinopril 20 mg tablet, Take 1 tablet (20 mg) by mouth once daily., Disp: 30 tablet, Rfl: 5    meloxicam (Mobic) 15 mg tablet, TAKE 1 TABLET BY MOUTH ONCE  DAILY, Disp: 30 tablet, Rfl: 1    metFORMIN (Glucophage) 1,000 mg tablet, Take 1 tablet (1,000 mg) by mouth 2 times a day., Disp: 60 tablet, Rfl: 5    metoprolol ta-hydrochlorothiaz (Lopressor HCT) 100-25 mg tablet, TAKE 1 TABLET BY MOUTH ONCE DAILY, Disp: 90 tablet, Rfl: 1    Current Facility-Administered Medications:     Tc-99m tetrofosmin (Myoview) injection 12.7 millicurie, 12.7 millicurie, intravenous, Once in imaging, Eden Mccormack MD    Tc-99m tetrofosmin (Myoview) injection 35.7 millicurie, 35.7 millicurie, intravenous, Once in imaging, Eden Mccormack MD    ALLERGIES:  Allergies   Allergen Reactions    Penicillins Anaphylaxis     STATES THROAT CLOSES       REVIEW OF SYSTEMS:  GENERAL: Obese. Negative for malaise, significant weight loss and fever  NECK: Negative for lumps, goiter, pain and significant neck swelling  RESPIRATORY: Negative for cough, wheezing or shortness of breath.  CARDIOVASCULAR: Negative for chest pain, leg swelling or palpitations.  GI: Negative for abdominal discomfort, blood in stools or black stools or change in bowel habits  : No history of dysuria, frequency or incontinence  MUSCULOSKELETAL: Negative for joint pain or swelling, back pain or muscle pain.  SKIN: Negative for lesions, rash, and itching.  PSYCH: Negative for sleep disturbance, mood disorder and recent psychosocial stressors.  ENDOCRINE: Negative for cold or heat intolerance, polyuria, polydipsia and goiter.    PHYSICAL EXAM:  Visit Vitals  /83 (BP Location: Left arm, Patient Position: Sitting, BP Cuff Size: Large adult long)   Pulse 67       General appearance: obese  Skin: warm, no erythema or rashes  Lungs: clear to percussion and auscultation  Heart: regular rhythm and S1, S2 normal  Abdomen: soft, non-tender, no masses, no organomegaly  Extremities: Normal exam of the extremities. No swelling or pain.    No results found for this or any previous visit (from the past 24 hour(s)).    IMPRESSION:  Ramirez Rodriguez is a  43 y.o. y.o. male with a BMI of Body mass index is 46.99 kg/m²..    They have been preoperatively evaluated and deemed to be an appropriate candidate for bariatric surgery.  Surgery Type: Laparoscopic mikel en y gastric bypass 32451    All testing reviewed.  All clearances contained.    PLAN:    The risks of Laparoscopic mikel en y gastric bypass 72608 surgery including bleeding, leak, wound infection, dehydration, ulcers, internal hernia, ulcer, chronic pain,  DVT/PE, prolonged nausea/vomiting, incomplete resolution of associated medical conditions, reflux, weight regain, vitamin/mineral deficiencies, and death have been explained to the patient and Ramirez Rodriguez has expressed understanding and acceptance of them.    The benefits of the above surgery including weight loss, improvement/resolution of associated medical and mental health conditions, improved mobility, and decreased mortality have been explained the the patient and Ramirez Rodriguez has expressed understanding and acceptance of them.    Operative and blood transfusion consent forms were signed by the patient and witnessed today.    Prescriptions for all required post-operative home medications were sent to the patient's pharmacy today and the patient will pick them up prior to surgery.    Further education was provided on day of surgery instructions and what to expect from the inpatient admission after surgery.    Tiny Wheeler MD   Bariatric and Minimally Invasive General Surgery

## 2024-07-24 NOTE — PROGRESS NOTES
Received fax and email via mmo.  Received my request about changing patient's DOS.    Emailed MMO back giving patient's case number and new dos for surgery

## 2024-07-29 ENCOUNTER — HOSPITAL ENCOUNTER (OUTPATIENT)
Dept: RADIOLOGY | Facility: HOSPITAL | Age: 44
Discharge: HOME | End: 2024-07-29
Payer: COMMERCIAL

## 2024-07-29 ENCOUNTER — PRE-ADMISSION TESTING (OUTPATIENT)
Dept: PREADMISSION TESTING | Facility: HOSPITAL | Age: 44
End: 2024-07-29
Payer: COMMERCIAL

## 2024-07-29 VITALS
DIASTOLIC BLOOD PRESSURE: 88 MMHG | HEART RATE: 58 BPM | TEMPERATURE: 96.6 F | BODY MASS INDEX: 40.43 KG/M2 | RESPIRATION RATE: 18 BRPM | HEIGHT: 74 IN | SYSTOLIC BLOOD PRESSURE: 137 MMHG | WEIGHT: 315 LBS | OXYGEN SATURATION: 99 %

## 2024-07-29 DIAGNOSIS — Z01.818 PREOPERATIVE CLEARANCE: ICD-10-CM

## 2024-07-29 DIAGNOSIS — Z98.84 BARIATRIC SURGERY STATUS: ICD-10-CM

## 2024-07-29 DIAGNOSIS — Z01.818 PREOP TESTING: Primary | ICD-10-CM

## 2024-07-29 LAB
ABO GROUP (TYPE) IN BLOOD: NORMAL
ABO GROUP (TYPE) IN BLOOD: NORMAL
ALBUMIN SERPL BCP-MCNC: 4.3 G/DL (ref 3.4–5)
ALP SERPL-CCNC: 45 U/L (ref 33–120)
ALT SERPL W P-5'-P-CCNC: 23 U/L (ref 10–52)
ANION GAP SERPL CALC-SCNC: 13 MMOL/L (ref 10–20)
ANTIBODY SCREEN: NORMAL
APPEARANCE UR: CLEAR
AST SERPL W P-5'-P-CCNC: 17 U/L (ref 9–39)
BASOPHILS # BLD AUTO: 0.04 X10*3/UL (ref 0–0.1)
BASOPHILS NFR BLD AUTO: 0.6 %
BILIRUB SERPL-MCNC: 0.5 MG/DL (ref 0–1.2)
BILIRUB UR STRIP.AUTO-MCNC: NEGATIVE MG/DL
BUN SERPL-MCNC: 16 MG/DL (ref 6–23)
CALCIUM SERPL-MCNC: 9.4 MG/DL (ref 8.6–10.3)
CHLORIDE SERPL-SCNC: 104 MMOL/L (ref 98–107)
CO2 SERPL-SCNC: 27 MMOL/L (ref 21–32)
COLOR UR: NORMAL
CREAT SERPL-MCNC: 0.84 MG/DL (ref 0.5–1.3)
EGFRCR SERPLBLD CKD-EPI 2021: >90 ML/MIN/1.73M*2
EOSINOPHIL # BLD AUTO: 1.01 X10*3/UL (ref 0–0.7)
EOSINOPHIL NFR BLD AUTO: 13.9 %
ERYTHROCYTE [DISTWIDTH] IN BLOOD BY AUTOMATED COUNT: 13.8 % (ref 11.5–14.5)
EST. AVERAGE GLUCOSE BLD GHB EST-MCNC: 108 MG/DL
GLUCOSE SERPL-MCNC: 109 MG/DL (ref 74–99)
GLUCOSE UR STRIP.AUTO-MCNC: NORMAL MG/DL
HBA1C MFR BLD: 5.4 %
HCT VFR BLD AUTO: 43.6 % (ref 41–52)
HGB BLD-MCNC: 14.7 G/DL (ref 13.5–17.5)
HOLD SPECIMEN: NORMAL
IMM GRANULOCYTES # BLD AUTO: 0.02 X10*3/UL (ref 0–0.7)
IMM GRANULOCYTES NFR BLD AUTO: 0.3 % (ref 0–0.9)
INR PPP: 1.1 (ref 0.9–1.1)
KETONES UR STRIP.AUTO-MCNC: NEGATIVE MG/DL
LEUKOCYTE ESTERASE UR QL STRIP.AUTO: NEGATIVE
LYMPHOCYTES # BLD AUTO: 2.41 X10*3/UL (ref 1.2–4.8)
LYMPHOCYTES NFR BLD AUTO: 33.1 %
MCH RBC QN AUTO: 28.5 PG (ref 26–34)
MCHC RBC AUTO-ENTMCNC: 33.7 G/DL (ref 32–36)
MCV RBC AUTO: 85 FL (ref 80–100)
MONOCYTES # BLD AUTO: 0.39 X10*3/UL (ref 0.1–1)
MONOCYTES NFR BLD AUTO: 5.4 %
NEUTROPHILS # BLD AUTO: 3.4 X10*3/UL (ref 1.2–7.7)
NEUTROPHILS NFR BLD AUTO: 46.7 %
NITRITE UR QL STRIP.AUTO: NEGATIVE
NRBC BLD-RTO: 0 /100 WBCS (ref 0–0)
PH UR STRIP.AUTO: 6 [PH]
PLATELET # BLD AUTO: 289 X10*3/UL (ref 150–450)
POTASSIUM SERPL-SCNC: 4.5 MMOL/L (ref 3.5–5.3)
PROT SERPL-MCNC: 7.2 G/DL (ref 6.4–8.2)
PROT UR STRIP.AUTO-MCNC: NEGATIVE MG/DL
PROTHROMBIN TIME: 12.6 SECONDS (ref 9.8–12.8)
RBC # BLD AUTO: 5.16 X10*6/UL (ref 4.5–5.9)
RBC # UR STRIP.AUTO: NEGATIVE /UL
RH FACTOR (ANTIGEN D): NORMAL
RH FACTOR (ANTIGEN D): NORMAL
SODIUM SERPL-SCNC: 139 MMOL/L (ref 136–145)
SP GR UR STRIP.AUTO: 1.01
UROBILINOGEN UR STRIP.AUTO-MCNC: NORMAL MG/DL
WBC # BLD AUTO: 7.3 X10*3/UL (ref 4.4–11.3)

## 2024-07-29 PROCEDURE — 83036 HEMOGLOBIN GLYCOSYLATED A1C: CPT

## 2024-07-29 PROCEDURE — 86901 BLOOD TYPING SEROLOGIC RH(D): CPT

## 2024-07-29 PROCEDURE — 80323 ALKALOIDS NOS: CPT

## 2024-07-29 PROCEDURE — 85025 COMPLETE CBC W/AUTO DIFF WBC: CPT

## 2024-07-29 PROCEDURE — 80053 COMPREHEN METABOLIC PANEL: CPT

## 2024-07-29 PROCEDURE — 71045 X-RAY EXAM CHEST 1 VIEW: CPT | Performed by: RADIOLOGY

## 2024-07-29 PROCEDURE — 87081 CULTURE SCREEN ONLY: CPT | Mod: PARLAB

## 2024-07-29 PROCEDURE — 93010 ELECTROCARDIOGRAM REPORT: CPT | Performed by: STUDENT IN AN ORGANIZED HEALTH CARE EDUCATION/TRAINING PROGRAM

## 2024-07-29 PROCEDURE — 81003 URINALYSIS AUTO W/O SCOPE: CPT

## 2024-07-29 PROCEDURE — 36415 COLL VENOUS BLD VENIPUNCTURE: CPT

## 2024-07-29 PROCEDURE — 85610 PROTHROMBIN TIME: CPT

## 2024-07-29 PROCEDURE — 93005 ELECTROCARDIOGRAM TRACING: CPT

## 2024-07-29 PROCEDURE — 71045 X-RAY EXAM CHEST 1 VIEW: CPT

## 2024-07-29 RX ORDER — CHLORHEXIDINE GLUCONATE ORAL RINSE 1.2 MG/ML
15 SOLUTION DENTAL DAILY
Qty: 30 ML | Refills: 0 | Status: SHIPPED | OUTPATIENT
Start: 2024-07-29 | End: 2024-07-31

## 2024-07-29 RX ORDER — CHLORHEXIDINE GLUCONATE 40 MG/ML
SOLUTION TOPICAL DAILY
Qty: 118 ML | Refills: 0 | Status: SHIPPED | OUTPATIENT
Start: 2024-07-29 | End: 2024-08-03

## 2024-07-29 ASSESSMENT — DUKE ACTIVITY SCORE INDEX (DASI)
CAN YOU WALK A BLOCK OR TWO ON LEVEL GROUND: YES
CAN YOU CLIMB A FLIGHT OF STAIRS OR WALK UP A HILL: YES
CAN YOU DO YARD WORK LIKE RAKING LEAVES, WEEDING OR PUSHING A MOWER: YES
CAN YOU WALK INDOORS, SUCH AS AROUND YOUR HOUSE: YES
CAN YOU RUN A SHORT DISTANCE: YES
CAN YOU HAVE SEXUAL RELATIONS: YES
CAN YOU PARTICIPATE IN STRENOUS SPORTS LIKE SWIMMING, SINGLES TENNIS, FOOTBALL, BASKETBALL, OR SKIING: NO
TOTAL_SCORE: 44.7
CAN YOU DO HEAVY WORK AROUND THE HOUSE LIKE SCRUBBING FLOORS OR LIFTING AND MOVING HEAVY FURNITURE: YES
CAN YOU DO LIGHT WORK AROUND THE HOUSE LIKE DUSTING OR WASHING DISHES: YES
CAN YOU TAKE CARE OF YOURSELF (EAT, DRESS, BATHE, OR USE TOILET): YES
CAN YOU PARTICIPATE IN MODERATE RECREATIONAL ACTIVITIES LIKE GOLF, BOWLING, DANCING, DOUBLES TENNIS OR THROWING A BASEBALL OR FOOTBALL: NO
DASI METS SCORE: 8.2
CAN YOU DO MODERATE WORK AROUND THE HOUSE LIKE VACUUMING, SWEEPING FLOORS OR CARRYING GROCERIES: YES

## 2024-07-29 NOTE — PREPROCEDURE INSTRUCTIONS
Medication List            Accurate as of July 29, 2024  8:18 AM. Always use your most recent med list.                albuterol 90 mcg/actuation inhaler  Medication Adjustments for Surgery: Other (Comment)  Notes to patient: Continue as prescribed     dilTIAZem  mg 24 hr capsule  Commonly known as: Cardizem CD  Take 1 capsule (240 mg) by mouth once daily.  Medication Adjustments for Surgery: Other (Comment)  Notes to patient: Continue as prescribed     DULoxetine 60 mg DR capsule  Commonly known as: Cymbalta  Take 1 capsule (60 mg) by mouth once daily.  Medication Adjustments for Surgery: Other (Comment)  Notes to patient: Continue as prescribed     glipiZIDE 5 mg tablet  Commonly known as: Glucotrol  Take 1 tablet (5 mg) by mouth 2 times a day before meals.  Medication Adjustments for Surgery: Other (Comment)  Notes to patient: Hold day of surgery     lisinopril 20 mg tablet  Take 1 tablet (20 mg) by mouth once daily.  Medication Adjustments for Surgery: Other (Comment)  Notes to patient: Do not take night before or day of surgery - (Do not take 24 hours before surgery)     meloxicam 15 mg tablet  Commonly known as: Mobic  TAKE 1 TABLET BY MOUTH ONCE DAILY  Medication Adjustments for Surgery: Stop 7 days before surgery     metFORMIN 1,000 mg tablet  Commonly known as: Glucophage  Take 1 tablet (1,000 mg) by mouth 2 times a day.  Medication Adjustments for Surgery: Other (Comment)  Notes to patient: Hold day of surgery     metoprolol ta-hydrochlorothiaz 100-25 mg tablet  Commonly known as: Lopressor HCT  TAKE 1 TABLET BY MOUTH ONCE DAILY  Medication Adjustments for Surgery: Other (Comment)  Notes to patient: Continue as prescribed     Trulicity 3 mg/0.5 mL pen injector  Generic drug: dulaglutide  Inject 3 mg under the skin 1 (one) time per week.  Medication Adjustments for Surgery: Stop 7 days before surgery          PRE-OPERATIVE INSTRUCTIONS FOR SURGERY    *Do not eat anything after midnight the night of  surgery.  This includes food of any kind (including hard candy, cough drops, mints).   You may have up to 13.5 ounces of clear liquid  until TWO hours prior to your arrival time to the hospital Clear liquids include water, black tea/coffee, (no milk or cream) apple juice and electrolyte drinks (GATORADE).  You may chew gum until TWO hours prior  your surgery/procedure.     REMEMBER YOU ARE NOT TO HAVE ANY SOLID FOODS 24 HOUR PRIOR TO SURGERY - ONLY CLEAR LIQUID DIET       PLEASE REVIEW YOUR MEDICATION LIST so as to determine how your medications are to be held, stopped prior to your surgery, as well as which medications should be taken the morning of surgery with sips of water.    -------------------------------------------------    *One of our staff members will call you ONE business day before your surgery, between 11 am-2 pm to let you know the time to arrive.    If you have not received a call by 2 pm, call [phone]    *When you arrive at the hospital-->GO TO Registration on the ground floor    *Stop smoking 24 hours prior to surgery.      No Marijuana, CBD Oil or Vaping for 48 hours    *No alcohol 24 hours prior to surgery    *You will need a responsible adult to drive you home    -No acrylic nails or nail polish on at least one fingernail, NO polish on toes for foot surgery    -You may be asked to remove your dentures, partial plate, eyeglasses or contact lenses before going to surgery.  Please bring a case for these items.    -Body piercings need to be removed.  Jewelry and valuables should be left at home.    -Put on loose,  comfortable, clean clothing, that will accommodate bandages    -------------------------------------------------      What is a home antibacterial shower?  This shower is a way of cleaning the skin with a germ killing solution before surgery.  The solution contains chlorhexidine, commonly known as CHG.  CHG is a skin cleanser with germ killing ability.  Let your doctor know if you  are allergic to chlorhexidine.    Why do I need to take a preoperative antibacterial shower?  Skin is not sterile.  It is best to try to make your skin as free of germs as possible before surgery.  Proper cleansing with a germ killing soap before surgery can lower the number of germs on your skin.  This helps to reduce the risk of infection at the surgical site.  Following the instructions listed below will help you prepare your skin for surgery.      How do I use the solution?    Steps: Begin using your CHG soap 5 days before your surgery on __________________.    *First, wash and rinse your hair using the CHG soap.  Keep CHG soap away from ear canals and eyes.   Rinse completely, do not condition.  Hair extensions should be removed.    *Wash your face with your normal soap and rinse.   *Apply the CHG solution to a clean wet washcloth.  Turn the water off or move away from the water spray to avoid premature rinsing of the CHG soap as you are applying.  Firmly lather your entire body from the neck down.  Do not use on your face.    *Pay special attention to the area(s) where your incision(s) will be located unless they are on your face.  Avoid scrubbing your skin too hard.  The important part is to have the CHG soap sit on your skin for 3 minutes.   *When the 3 minutes are up, turn on the water and rinse the CHG solution off your body completely.  *Do not wash with regular soap after you  have  used the CHG soap solution.  *Pat  yourself dry with a clean, freshly laundered towel.  *Do not apply powders, deodorants or lotions.  *Dress in clean freshly laundered night clothes.    *Be sure to sleep with clean freshly laundered sheets.    *Be aware the CHG will cause stains on fabrics; if you wash them with bleach after use.  Rinse your washcloth and other linens that have contact with CHG completely.  Use only non-chlorine detergents to launder the items  used.  *The morning of surgery is the fifth day.  Repeat the  above steps and dress in clean comfortable clothing.         What is oral/dental rinse?  It is mouthwash.  It is a way of cleaning the he mouth with a germ-killing solution before your surgery.  The solution contains chlorhexidine, commonly known as CHG.  It is used inside the mouth to kill a bacteria known as Staphylococcus aureus.  Let your doctor know if you are allergic to Chlorhexidine.    Why do I need to use CHG oral/ dental rinse?  The CHG oral/dental rinse helps to kill bacteria in your mouth know as Staphylococcus aureus.  This reduces the risk of infection at the surgical site.    Using your CHG oral/dental rinse    STEPS:    Use your CHG oral/dental rinse after you brush your teeth the night before (at bedtime) and the morning of your surgery.  Follow all the directions on your prescription label.  *Use the cap on the container to measure 15 ml  *Swish (gargle if you can) the mouthwash in your mouth for at least 30 seconds, (do not swallow) and spit out  *After you use your CHG rinse, do not rinse your mouth with water, drink or eat.  Please refer to the prescription label for the appropriate time to resume oral intake.    What side effects might I have using the CHG oral/dental rinse?  CHG rinse will stick you plaque on the teeth.  Brush and floss just before use.   Teeth brushing will help avoid staining of the plaque during  use.  Teeth brushing will help avoid staining of plaque during  use.    Who should I contact if I have questions about the CHG oral/dental rinse and or CHG soap?  Please call [facility], Pre-Admission testing at [phone] if you have any questions.    -------------------------------------------------      What you may be asked to bring to surgery:    ___PHOTO ID and insurance info    ___CPAP machine      -------------------------------------------------  NPO Instructions:    Do not eat any food after midnight the night before your  surgery/procedure.  You may have clear liquids until TWO hours before surgery/procedure. This includes water, black tea/coffee, (no milk or cream) apple juice and electrolyte drinks (Gatorade).  You may chew gum up to TWO hours before your surgery/procedure.    Additional Instructions:     Day of Surgery:  If you have diabetes, please check your fasting blood sugar upon awakening.  If fasting blood sugar is <80 mg/dl, drink 100 ml of apple juice, time limit of 2 hours before  You may have clear liquids until TWO hours before surgery/procedure.  This includes water, black tea/coffee, (no milk or cream) apple juice and electrolyte drinks (Gatorade)  You may chew gum up to TWO hours before your surgery/procedure  Wear  comfortable loose fitting clothing  Do not use moisturizers, creams, lotions or perfume  All jewelry and valuables should be left at home    -------------------------------------------------

## 2024-07-29 NOTE — CPM/PAT H&P
CPM/PAT Evaluation       Name: Ramirez Rodriguez (Ramirez Rodriguez)  /Age: 1980/43 y.o.     In-Person       Chief Complaint: Morbid obesity    43 yr old male with c/o morbid obesity.  Reports ongoing difficulty with healthy weight management since childhood.  Has tried various diets including weight watchers, keto, etc., exercise and OTC medications with no lasting results.  Reports most amount of weight lost at a time has only been about 20 lbs.  Recently been consuming lots of salads along with high protein foods.  Reports remaining otherwise physically active, denies cardiac or respiratory symptoms.  Reports rescue inhaler use has not needed for over a year. Denies past issues with anesthesia.     Followed by PCP (JESS Tarango) - last visit 2024 for s/p appendectomy ([facility])  Followed by cardiology (Jone) - 2024 for cardiac clearance   Followed by pulmonary (Leyla) - last visit 2024 for pulmonary clearance     Nuclear stress test 2024       Impression  1. Abnormal pharmacologic stress myocardial perfusion scan as the  left ventricular cavity is moderately dilated at stress and rest.      2. Normal perfusion with no evidence for flow limiting coronary  disease at this time.      3. The computer-generated ejection fraction is preserved 58%        Past Medical History:   Diagnosis Date    Diabetes mellitus (Multi)     HTN (hypertension)     Hyperlipidemia     Morbid obesity (Multi)     Morbid obesity (Multi)     Pulmonary arterial hypertension (Multi)     Severe obstructive sleep apnea        Past Surgical History:   Procedure Laterality Date    APPENDECTOMY  2023    SHOULDER Right        Patient  reports that he is not currently sexually active.    Family History   Problem Relation Name Age of Onset    Heart disease Mother Haritha     Stroke Mother Haritha     Hypertension Mother Haritha     Alcohol abuse Other      Stroke Other      Diabetes Other Grandfather         Does not  specify which one    Diabetes Paternal Grandfather Ramirez        Allergies   Allergen Reactions    Penicillins Anaphylaxis     STATES THROAT CLOSES       Prior to Admission medications    Medication Sig Start Date End Date Taking? Authorizing Provider   albuterol 90 mcg/actuation inhaler INHALE 2 PUFFS BY MOUTH PRIOR TO EXERTION/ EXERCISE AND EVERY 4 TO 6 HOURS AS NEEDED FOR COUGH OR SHORTNESS OF BREATH   Yes Historical Provider, MD   dilTIAZem CD (Cardizem CD) 240 mg 24 hr capsule Take 1 capsule (240 mg) by mouth once daily. 4/23/24 4/23/25 Yes Jael Tarango MD   dulaglutide (Trulicity) 3 mg/0.5 mL pen injector Inject 3 mg under the skin 1 (one) time per week. 5/19/24  Yes Jael Tarango MD   DULoxetine (Cymbalta) 60 mg DR capsule Take 1 capsule (60 mg) by mouth once daily. 5/23/24  Yes Jael Tarango MD   glipiZIDE (Glucotrol) 5 mg tablet Take 1 tablet (5 mg) by mouth 2 times a day before meals. 9/20/23 7/29/24 Yes Jael Tarango MD   lisinopril 20 mg tablet Take 1 tablet (20 mg) by mouth once daily. 9/20/23 7/29/24 Yes Jael Tarango MD   meloxicam (Mobic) 15 mg tablet TAKE 1 TABLET BY MOUTH ONCE DAILY 1/30/24  Yes Jael Tarango MD   metFORMIN (Glucophage) 1,000 mg tablet Take 1 tablet (1,000 mg) by mouth 2 times a day. 12/20/23  Yes Jael Tarango MD   metoprolol ta-hydrochlorothiaz (Lopressor HCT) 100-25 mg tablet TAKE 1 TABLET BY MOUTH ONCE DAILY 1/30/24  Yes Jael Tarango MD        Review of Systems    Constitutional: no fever, no chills and not feeling poorly.   Eyes: no eyesight problems.   ENT: no hearing loss, no nosebleeds and no sore throat.   Cardiovascular: no chest pain, no palpitations and no extremity edema.   Respiratory: no shortness of breath, no wheezing, no cough and no sob w/exertion, LYNDSEY w/CPAP use  Gastrointestinal: negative for abdominal pain, blood in stools or changes in bowel habits   Genitourinary: negative for dysuria, incontinence or changes in urinary habits   Musculoskeletal: no arthralgias  and no gait abnormality.   Integumentary: negative for lesions, rash or itching.   Neurological: negative for confusion, dizziness or fainting.   Psychiatric: not suicidal, no anxiety and no depression.   All other systems have been reviewed and are negative for complaint.     Physical Exam  Constitutional:       General: No acute distress.     Aox3, pleasant and cooperative, appropriate mood and eye contact   HENT:      Head: Normocephalic.      Mouth/Throat: Mucous membranes moist & pink  Eyes:      Vision grossly intact, PERRLA, corrective lenses in use   Neck:      No carotid bruit, no JVD  Cardiovascular:      RRR, S1S2, no murmurs, rubs or gallops  Pulmonary:      Symmetric chest expansion, CTA, Room Air  Abdominal:      Soft non-tender, BSx4   Skin:     Warm, dry & intact   Extremities:      No gross deformities; normal gait   Neurological:      No focal deficit, Aox3, ORO x4  Psychiatric:      Pleasant & cooperative, appropriate affect    PAT AIRWAY:   Airway:     Mallampati::  III    TM distance::  >3 FB    Neck ROM::  Full  normal        Visit Vitals  /88   Pulse 58   Temp 35.9 °C (96.6 °F) (Temporal)   Resp 18       DASI Risk Score      Flowsheet Row Most Recent Value   DASI SCORE 44.7   METS Score (Will be calculated only when all the questions are answered) 8.2          Caprini DVT Assessment    No data to display       Modified Frailty Index    No data to display       CHADS2 Stroke Risk  Current as of 16 minutes ago        N/A 3 to 100%: High Risk   2 to < 3%: Medium Risk   0 to < 2%: Low Risk     Last Change: N/A          This score determines the patient's risk of having a stroke if the patient has atrial fibrillation.        This score is not applicable to this patient. Components are not calculated.          Revised Cardiac Risk Index    No data to display       Apfel Simplified Score    No data to display       Risk Analysis Index Results This Encounter    No data found in the last 1  encounters.       Stop Bang Score      Flowsheet Row Most Recent Value   Do you snore loudly? 1   Do you often feel tired or fatigued after your sleep? 0   Has anyone ever observed you stop breathing in your sleep? 1   Do you have or are you being treated for high blood pressure? 0   Recent BMI (Calculated) 51   Is BMI greater than 35 kg/m2? 1=Yes   Age older than 50 years old? 0=No   Is your neck circumference greater than 17 inches (Male) or 16 inches (Female)? 1   Gender - Male 1=Yes   STOP-BANG Total Score 5            Assessment and Plan:     Followed by bariatric surgery, Morbid obesity    Robot assisted laparoscopic gastric bypass w/Dr Wheeler & Dr Mendoza on 8/7/2024    Ecg performed today

## 2024-07-31 LAB
ATRIAL RATE: 56 BPM
P AXIS: 5 DEGREES
P OFFSET: 161 MS
P ONSET: 117 MS
PR INTERVAL: 172 MS
Q ONSET: 203 MS
QRS COUNT: 10 BEATS
QRS DURATION: 122 MS
QT INTERVAL: 432 MS
QTC CALCULATION(BAZETT): 416 MS
QTC FREDERICIA: 422 MS
R AXIS: -38 DEGREES
STAPHYLOCOCCUS SPEC CULT: ABNORMAL
T AXIS: 147 DEGREES
T OFFSET: 419 MS
VENTRICULAR RATE: 56 BPM

## 2024-08-01 ENCOUNTER — APPOINTMENT (OUTPATIENT)
Dept: SURGERY | Facility: CLINIC | Age: 44
End: 2024-08-01
Payer: COMMERCIAL

## 2024-08-01 ENCOUNTER — PATIENT MESSAGE (OUTPATIENT)
Dept: PRIMARY CARE | Facility: CLINIC | Age: 44
End: 2024-08-01

## 2024-08-01 ENCOUNTER — OFFICE VISIT (OUTPATIENT)
Dept: SURGERY | Facility: CLINIC | Age: 44
End: 2024-08-01
Payer: COMMERCIAL

## 2024-08-01 VITALS
OXYGEN SATURATION: 95 % | HEART RATE: 67 BPM | WEIGHT: 315 LBS | DIASTOLIC BLOOD PRESSURE: 83 MMHG | HEIGHT: 74 IN | SYSTOLIC BLOOD PRESSURE: 139 MMHG | BODY MASS INDEX: 40.43 KG/M2

## 2024-08-01 DIAGNOSIS — Z98.84 BARIATRIC SURGERY STATUS: ICD-10-CM

## 2024-08-01 DIAGNOSIS — E11.65 TYPE 2 DIABETES MELLITUS WITH HYPERGLYCEMIA, WITHOUT LONG-TERM CURRENT USE OF INSULIN (MULTI): ICD-10-CM

## 2024-08-01 DIAGNOSIS — M25.551 PAIN OF RIGHT HIP: ICD-10-CM

## 2024-08-01 DIAGNOSIS — Z71.3 PRE-BARIATRIC SURGERY NUTRITION EVALUATION: ICD-10-CM

## 2024-08-01 DIAGNOSIS — Z13.21 ENCOUNTER FOR VITAMIN DEFICIENCY SCREENING: ICD-10-CM

## 2024-08-01 DIAGNOSIS — G47.33 OSA TREATED WITH BIPAP: ICD-10-CM

## 2024-08-01 DIAGNOSIS — I10 HYPERTENSION, UNSPECIFIED TYPE: ICD-10-CM

## 2024-08-01 DIAGNOSIS — E66.01 OBESITY, MORBID, BMI 50 OR HIGHER (MULTI): Primary | ICD-10-CM

## 2024-08-01 DIAGNOSIS — Z98.890 POST-OPERATIVE NAUSEA AND VOMITING: ICD-10-CM

## 2024-08-01 DIAGNOSIS — R11.2 POST-OPERATIVE NAUSEA AND VOMITING: ICD-10-CM

## 2024-08-01 DIAGNOSIS — G89.18 POST-OP PAIN: ICD-10-CM

## 2024-08-01 DIAGNOSIS — Z29.9 ENCOUNTER FOR DEEP VEIN THROMBOSIS PROPHYLAXIS: ICD-10-CM

## 2024-08-01 PROCEDURE — 99215 OFFICE O/P EST HI 40 MIN: CPT | Performed by: SURGERY

## 2024-08-01 PROCEDURE — 1036F TOBACCO NON-USER: CPT | Performed by: SURGERY

## 2024-08-01 PROCEDURE — 3075F SYST BP GE 130 - 139MM HG: CPT | Performed by: SURGERY

## 2024-08-01 PROCEDURE — 3044F HG A1C LEVEL LT 7.0%: CPT | Performed by: SURGERY

## 2024-08-01 PROCEDURE — 3079F DIAST BP 80-89 MM HG: CPT | Performed by: SURGERY

## 2024-08-01 PROCEDURE — 3008F BODY MASS INDEX DOCD: CPT | Performed by: SURGERY

## 2024-08-01 PROCEDURE — 4010F ACE/ARB THERAPY RXD/TAKEN: CPT | Performed by: SURGERY

## 2024-08-01 RX ORDER — OMEPRAZOLE 40 MG/1
40 CAPSULE, DELAYED RELEASE ORAL
Qty: 90 CAPSULE | Refills: 1 | Status: SHIPPED | OUTPATIENT
Start: 2024-08-01 | End: 2025-01-28

## 2024-08-01 RX ORDER — ONDANSETRON 4 MG/1
4 TABLET, ORALLY DISINTEGRATING ORAL EVERY 6 HOURS PRN
Qty: 60 TABLET | Refills: 1 | Status: SHIPPED | OUTPATIENT
Start: 2024-08-01

## 2024-08-01 RX ORDER — ENOXAPARIN SODIUM 100 MG/ML
60 INJECTION SUBCUTANEOUS DAILY
Qty: 28 EACH | Refills: 0 | Status: SHIPPED | OUTPATIENT
Start: 2024-08-01 | End: 2024-08-29

## 2024-08-01 RX ORDER — OXYCODONE HCL 5 MG/5 ML
5 SOLUTION, ORAL ORAL EVERY 6 HOURS PRN
Qty: 140 ML | Refills: 0 | Status: SHIPPED | OUTPATIENT
Start: 2024-08-01 | End: 2024-08-08

## 2024-08-02 LAB
ANABASINE UR-MCNC: <5 NG/ML
COTININE UR-MCNC: <15 NG/ML
NICOTINE UR-MCNC: <15 NG/ML
TRANS-3-OH-COTININE UR-MCNC: <50 NG/ML

## 2024-08-02 RX ORDER — LISINOPRIL 20 MG/1
20 TABLET ORAL DAILY
Qty: 90 TABLET | Refills: 0 | Status: SHIPPED | OUTPATIENT
Start: 2024-08-02

## 2024-08-02 RX ORDER — DILTIAZEM HYDROCHLORIDE 240 MG/1
240 CAPSULE, COATED, EXTENDED RELEASE ORAL DAILY
Qty: 90 CAPSULE | Refills: 0 | Status: SHIPPED | OUTPATIENT
Start: 2024-08-02 | End: 2025-08-02

## 2024-08-02 RX ORDER — MELOXICAM 15 MG/1
15 TABLET ORAL DAILY
Qty: 90 TABLET | Refills: 0 | Status: SHIPPED | OUTPATIENT
Start: 2024-08-02

## 2024-08-12 ENCOUNTER — ANESTHESIA (OUTPATIENT)
Dept: OPERATING ROOM | Facility: HOSPITAL | Age: 44
DRG: 621 | End: 2024-08-12
Payer: COMMERCIAL

## 2024-08-12 ENCOUNTER — HOSPITAL ENCOUNTER (INPATIENT)
Facility: HOSPITAL | Age: 44
LOS: 2 days | Discharge: HOME | DRG: 621 | End: 2024-08-14
Attending: SURGERY | Admitting: STUDENT IN AN ORGANIZED HEALTH CARE EDUCATION/TRAINING PROGRAM
Payer: COMMERCIAL

## 2024-08-12 ENCOUNTER — ANESTHESIA EVENT (OUTPATIENT)
Dept: OPERATING ROOM | Facility: HOSPITAL | Age: 44
DRG: 621 | End: 2024-08-12
Payer: COMMERCIAL

## 2024-08-12 DIAGNOSIS — E66.01 MORBID (SEVERE) OBESITY DUE TO EXCESS CALORIES (MULTI): Primary | ICD-10-CM

## 2024-08-12 LAB
ABO GROUP (TYPE) IN BLOOD: NORMAL
GLUCOSE BLD MANUAL STRIP-MCNC: 207 MG/DL (ref 74–99)
RH FACTOR (ANTIGEN D): NORMAL

## 2024-08-12 PROCEDURE — 2500000005 HC RX 250 GENERAL PHARMACY W/O HCPCS: Performed by: SURGERY

## 2024-08-12 PROCEDURE — 2500000004 HC RX 250 GENERAL PHARMACY W/ HCPCS (ALT 636 FOR OP/ED): Performed by: ANESTHESIOLOGY

## 2024-08-12 PROCEDURE — A43644 PR LAP GASTRIC BYPASS/ROUX-EN-Y: Performed by: ANESTHESIOLOGY

## 2024-08-12 PROCEDURE — 3700000002 HC GENERAL ANESTHESIA TIME - EACH INCREMENTAL 1 MINUTE: Performed by: SURGERY

## 2024-08-12 PROCEDURE — 3600000018 HC OR TIME - INITIAL BASE CHARGE - PROCEDURE LEVEL SIX: Performed by: SURGERY

## 2024-08-12 PROCEDURE — 8E0W4CZ ROBOTIC ASSISTED PROCEDURE OF TRUNK REGION, PERCUTANEOUS ENDOSCOPIC APPROACH: ICD-10-PCS | Performed by: SURGERY

## 2024-08-12 PROCEDURE — 7100000001 HC RECOVERY ROOM TIME - INITIAL BASE CHARGE: Performed by: SURGERY

## 2024-08-12 PROCEDURE — 2500000001 HC RX 250 WO HCPCS SELF ADMINISTERED DRUGS (ALT 637 FOR MEDICARE OP): Performed by: STUDENT IN AN ORGANIZED HEALTH CARE EDUCATION/TRAINING PROGRAM

## 2024-08-12 PROCEDURE — 2500000005 HC RX 250 GENERAL PHARMACY W/O HCPCS: Performed by: STUDENT IN AN ORGANIZED HEALTH CARE EDUCATION/TRAINING PROGRAM

## 2024-08-12 PROCEDURE — 2500000004 HC RX 250 GENERAL PHARMACY W/ HCPCS (ALT 636 FOR OP/ED): Performed by: NURSE ANESTHETIST, CERTIFIED REGISTERED

## 2024-08-12 PROCEDURE — 2500000005 HC RX 250 GENERAL PHARMACY W/O HCPCS: Performed by: NURSE ANESTHETIST, CERTIFIED REGISTERED

## 2024-08-12 PROCEDURE — RXMED WILLOW AMBULATORY MEDICATION CHARGE

## 2024-08-12 PROCEDURE — 36415 COLL VENOUS BLD VENIPUNCTURE: CPT | Performed by: SURGERY

## 2024-08-12 PROCEDURE — 7100000002 HC RECOVERY ROOM TIME - EACH INCREMENTAL 1 MINUTE: Performed by: SURGERY

## 2024-08-12 PROCEDURE — 2500000004 HC RX 250 GENERAL PHARMACY W/ HCPCS (ALT 636 FOR OP/ED): Performed by: SURGERY

## 2024-08-12 PROCEDURE — C1760 CLOSURE DEV, VASC: HCPCS | Performed by: SURGERY

## 2024-08-12 PROCEDURE — 2500000001 HC RX 250 WO HCPCS SELF ADMINISTERED DRUGS (ALT 637 FOR MEDICARE OP): Performed by: SURGERY

## 2024-08-12 PROCEDURE — 2500000004 HC RX 250 GENERAL PHARMACY W/ HCPCS (ALT 636 FOR OP/ED): Performed by: STUDENT IN AN ORGANIZED HEALTH CARE EDUCATION/TRAINING PROGRAM

## 2024-08-12 PROCEDURE — 82947 ASSAY GLUCOSE BLOOD QUANT: CPT

## 2024-08-12 PROCEDURE — 1100000001 HC PRIVATE ROOM DAILY

## 2024-08-12 PROCEDURE — 3700000001 HC GENERAL ANESTHESIA TIME - INITIAL BASE CHARGE: Performed by: SURGERY

## 2024-08-12 PROCEDURE — 3600000017 HC OR TIME - EACH INCREMENTAL 1 MINUTE - PROCEDURE LEVEL SIX: Performed by: SURGERY

## 2024-08-12 PROCEDURE — 43644 LAP GASTRIC BYPASS/ROUX-EN-Y: CPT | Performed by: SURGERY

## 2024-08-12 PROCEDURE — 2720000007 HC OR 272 NO HCPCS: Performed by: SURGERY

## 2024-08-12 PROCEDURE — A43644 PR LAP GASTRIC BYPASS/ROUX-EN-Y: Performed by: NURSE ANESTHETIST, CERTIFIED REGISTERED

## 2024-08-12 PROCEDURE — 0D164ZA BYPASS STOMACH TO JEJUNUM, PERCUTANEOUS ENDOSCOPIC APPROACH: ICD-10-PCS | Performed by: SURGERY

## 2024-08-12 RX ORDER — GABAPENTIN 300 MG/1
600 CAPSULE ORAL ONCE
Status: COMPLETED | OUTPATIENT
Start: 2024-08-12 | End: 2024-08-12

## 2024-08-12 RX ORDER — ONDANSETRON HYDROCHLORIDE 2 MG/ML
4 INJECTION, SOLUTION INTRAVENOUS EVERY 8 HOURS PRN
Status: DISCONTINUED | OUTPATIENT
Start: 2024-08-12 | End: 2024-08-14 | Stop reason: HOSPADM

## 2024-08-12 RX ORDER — MIDAZOLAM HYDROCHLORIDE 1 MG/ML
INJECTION, SOLUTION INTRAMUSCULAR; INTRAVENOUS AS NEEDED
Status: DISCONTINUED | OUTPATIENT
Start: 2024-08-12 | End: 2024-08-12

## 2024-08-12 RX ORDER — SCOLOPAMINE TRANSDERMAL SYSTEM 1 MG/1
1 PATCH, EXTENDED RELEASE TRANSDERMAL
Status: DISCONTINUED | OUTPATIENT
Start: 2024-08-12 | End: 2024-08-14 | Stop reason: HOSPADM

## 2024-08-12 RX ORDER — SODIUM CHLORIDE, SODIUM LACTATE, POTASSIUM CHLORIDE, CALCIUM CHLORIDE 600; 310; 30; 20 MG/100ML; MG/100ML; MG/100ML; MG/100ML
100 INJECTION, SOLUTION INTRAVENOUS CONTINUOUS
Status: DISCONTINUED | OUTPATIENT
Start: 2024-08-12 | End: 2024-08-12

## 2024-08-12 RX ORDER — METRONIDAZOLE 500 MG/100ML
500 INJECTION, SOLUTION INTRAVENOUS ONCE
Status: COMPLETED | OUTPATIENT
Start: 2024-08-12 | End: 2024-08-12

## 2024-08-12 RX ORDER — SODIUM CHLORIDE, SODIUM LACTATE, POTASSIUM CHLORIDE, CALCIUM CHLORIDE 600; 310; 30; 20 MG/100ML; MG/100ML; MG/100ML; MG/100ML
150 INJECTION, SOLUTION INTRAVENOUS CONTINUOUS
Status: DISCONTINUED | OUTPATIENT
Start: 2024-08-12 | End: 2024-08-14 | Stop reason: HOSPADM

## 2024-08-12 RX ORDER — METOPROLOL TARTRATE 1 MG/ML
2.5 INJECTION, SOLUTION INTRAVENOUS EVERY 8 HOURS
Status: DISPENSED | OUTPATIENT
Start: 2024-08-12 | End: 2024-08-12

## 2024-08-12 RX ORDER — ROCURONIUM BROMIDE 10 MG/ML
INJECTION, SOLUTION INTRAVENOUS AS NEEDED
Status: DISCONTINUED | OUTPATIENT
Start: 2024-08-12 | End: 2024-08-12

## 2024-08-12 RX ORDER — KETOROLAC TROMETHAMINE 30 MG/ML
15 INJECTION, SOLUTION INTRAMUSCULAR; INTRAVENOUS EVERY 6 HOURS
Status: COMPLETED | OUTPATIENT
Start: 2024-08-12 | End: 2024-08-13

## 2024-08-12 RX ORDER — MIDAZOLAM HYDROCHLORIDE 1 MG/ML
1 INJECTION, SOLUTION INTRAMUSCULAR; INTRAVENOUS ONCE AS NEEDED
Status: DISCONTINUED | OUTPATIENT
Start: 2024-08-12 | End: 2024-08-12 | Stop reason: HOSPADM

## 2024-08-12 RX ORDER — MEPERIDINE HYDROCHLORIDE 25 MG/ML
12.5 INJECTION INTRAMUSCULAR; INTRAVENOUS; SUBCUTANEOUS EVERY 10 MIN PRN
Status: DISCONTINUED | OUTPATIENT
Start: 2024-08-12 | End: 2024-08-12 | Stop reason: HOSPADM

## 2024-08-12 RX ORDER — FENTANYL CITRATE 50 UG/ML
INJECTION, SOLUTION INTRAMUSCULAR; INTRAVENOUS AS NEEDED
Status: DISCONTINUED | OUTPATIENT
Start: 2024-08-12 | End: 2024-08-12

## 2024-08-12 RX ORDER — PROMETHAZINE HYDROCHLORIDE 25 MG/ML
INJECTION, SOLUTION INTRAMUSCULAR; INTRAVENOUS AS NEEDED
Status: DISCONTINUED | OUTPATIENT
Start: 2024-08-12 | End: 2024-08-12

## 2024-08-12 RX ORDER — DULOXETIN HYDROCHLORIDE 30 MG/1
60 CAPSULE, DELAYED RELEASE ORAL DAILY
Status: DISCONTINUED | OUTPATIENT
Start: 2024-08-13 | End: 2024-08-14 | Stop reason: HOSPADM

## 2024-08-12 RX ORDER — ALBUTEROL SULFATE 90 UG/1
2 INHALANT RESPIRATORY (INHALATION) EVERY 6 HOURS PRN
Status: DISCONTINUED | OUTPATIENT
Start: 2024-08-12 | End: 2024-08-12

## 2024-08-12 RX ORDER — ALBUTEROL SULFATE 90 UG/1
2 INHALANT RESPIRATORY (INHALATION) EVERY 2 HOUR PRN
Status: DISCONTINUED | OUTPATIENT
Start: 2024-08-12 | End: 2024-08-14 | Stop reason: HOSPADM

## 2024-08-12 RX ORDER — HYDRALAZINE HYDROCHLORIDE 20 MG/ML
INJECTION INTRAMUSCULAR; INTRAVENOUS AS NEEDED
Status: DISCONTINUED | OUTPATIENT
Start: 2024-08-12 | End: 2024-08-12

## 2024-08-12 RX ORDER — DILTIAZEM HYDROCHLORIDE 240 MG/1
240 CAPSULE, COATED, EXTENDED RELEASE ORAL DAILY
Status: DISCONTINUED | OUTPATIENT
Start: 2024-08-13 | End: 2024-08-14

## 2024-08-12 RX ORDER — LIDOCAINE HCL/PF 100 MG/5ML
SYRINGE (ML) INTRAVENOUS AS NEEDED
Status: DISCONTINUED | OUTPATIENT
Start: 2024-08-12 | End: 2024-08-12

## 2024-08-12 RX ORDER — ONDANSETRON HYDROCHLORIDE 2 MG/ML
4 INJECTION, SOLUTION INTRAVENOUS ONCE AS NEEDED
Status: DISCONTINUED | OUTPATIENT
Start: 2024-08-12 | End: 2024-08-12 | Stop reason: HOSPADM

## 2024-08-12 RX ORDER — ONDANSETRON HYDROCHLORIDE 2 MG/ML
INJECTION, SOLUTION INTRAVENOUS AS NEEDED
Status: DISCONTINUED | OUTPATIENT
Start: 2024-08-12 | End: 2024-08-12

## 2024-08-12 RX ORDER — LABETALOL HYDROCHLORIDE 5 MG/ML
5 INJECTION, SOLUTION INTRAVENOUS ONCE AS NEEDED
Status: DISCONTINUED | OUTPATIENT
Start: 2024-08-12 | End: 2024-08-12 | Stop reason: HOSPADM

## 2024-08-12 RX ORDER — ACETAMINOPHEN 10 MG/ML
1000 INJECTION, SOLUTION INTRAVENOUS EVERY 6 HOURS SCHEDULED
Status: DISCONTINUED | OUTPATIENT
Start: 2024-08-12 | End: 2024-08-14 | Stop reason: HOSPADM

## 2024-08-12 RX ORDER — HYDRALAZINE HYDROCHLORIDE 20 MG/ML
5 INJECTION INTRAMUSCULAR; INTRAVENOUS EVERY 30 MIN PRN
Status: DISCONTINUED | OUTPATIENT
Start: 2024-08-12 | End: 2024-08-12 | Stop reason: HOSPADM

## 2024-08-12 RX ORDER — HEPARIN SODIUM 5000 [USP'U]/ML
5000 INJECTION, SOLUTION INTRAVENOUS; SUBCUTANEOUS ONCE
Status: COMPLETED | OUTPATIENT
Start: 2024-08-12 | End: 2024-08-12

## 2024-08-12 RX ORDER — BUPIVACAINE HYDROCHLORIDE 2.5 MG/ML
INJECTION, SOLUTION INFILTRATION; PERINEURAL AS NEEDED
Status: DISCONTINUED | OUTPATIENT
Start: 2024-08-12 | End: 2024-08-12 | Stop reason: HOSPADM

## 2024-08-12 RX ORDER — DEXMEDETOMIDINE HYDROCHLORIDE 100 UG/ML
INJECTION, SOLUTION INTRAVENOUS AS NEEDED
Status: DISCONTINUED | OUTPATIENT
Start: 2024-08-12 | End: 2024-08-12

## 2024-08-12 RX ORDER — HEPARIN SODIUM 5000 [USP'U]/ML
5000 INJECTION, SOLUTION INTRAVENOUS; SUBCUTANEOUS EVERY 8 HOURS
Status: DISCONTINUED | OUTPATIENT
Start: 2024-08-13 | End: 2024-08-14 | Stop reason: HOSPADM

## 2024-08-12 RX ORDER — SODIUM CHLORIDE, SODIUM LACTATE, POTASSIUM CHLORIDE, CALCIUM CHLORIDE 600; 310; 30; 20 MG/100ML; MG/100ML; MG/100ML; MG/100ML
100 INJECTION, SOLUTION INTRAVENOUS CONTINUOUS
Status: DISCONTINUED | OUTPATIENT
Start: 2024-08-12 | End: 2024-08-12 | Stop reason: HOSPADM

## 2024-08-12 RX ORDER — ALBUTEROL SULFATE 0.83 MG/ML
2.5 SOLUTION RESPIRATORY (INHALATION) ONCE AS NEEDED
Status: DISCONTINUED | OUTPATIENT
Start: 2024-08-12 | End: 2024-08-12 | Stop reason: HOSPADM

## 2024-08-12 RX ORDER — LIDOCAINE HYDROCHLORIDE 10 MG/ML
0.1 INJECTION INFILTRATION; PERINEURAL ONCE
Status: DISCONTINUED | OUTPATIENT
Start: 2024-08-12 | End: 2024-08-12 | Stop reason: HOSPADM

## 2024-08-12 RX ORDER — OXYCODONE HCL 5 MG/5 ML
5 SOLUTION, ORAL ORAL EVERY 4 HOURS PRN
Status: DISCONTINUED | OUTPATIENT
Start: 2024-08-12 | End: 2024-08-14 | Stop reason: HOSPADM

## 2024-08-12 RX ORDER — PROPOFOL 10 MG/ML
INJECTION, EMULSION INTRAVENOUS AS NEEDED
Status: DISCONTINUED | OUTPATIENT
Start: 2024-08-12 | End: 2024-08-12

## 2024-08-12 RX ORDER — SUCCINYLCHOLINE CHLORIDE 20 MG/ML INJECTION SOLUTION
SOLUTION AS NEEDED
Status: DISCONTINUED | OUTPATIENT
Start: 2024-08-12 | End: 2024-08-12

## 2024-08-12 RX ORDER — PANTOPRAZOLE SODIUM 40 MG/10ML
40 INJECTION, POWDER, LYOPHILIZED, FOR SOLUTION INTRAVENOUS DAILY
Status: DISCONTINUED | OUTPATIENT
Start: 2024-08-12 | End: 2024-08-14 | Stop reason: HOSPADM

## 2024-08-12 RX ORDER — ACETAMINOPHEN 325 MG/1
650 TABLET ORAL EVERY 4 HOURS PRN
Status: DISCONTINUED | OUTPATIENT
Start: 2024-08-12 | End: 2024-08-12 | Stop reason: HOSPADM

## 2024-08-12 RX ORDER — OXYCODONE HCL 5 MG/5 ML
10 SOLUTION, ORAL ORAL EVERY 4 HOURS PRN
Status: DISCONTINUED | OUTPATIENT
Start: 2024-08-12 | End: 2024-08-14 | Stop reason: HOSPADM

## 2024-08-12 SDOH — SOCIAL STABILITY: SOCIAL INSECURITY: HAVE YOU HAD THOUGHTS OF HARMING ANYONE ELSE?: NO

## 2024-08-12 SDOH — SOCIAL STABILITY: SOCIAL INSECURITY: DO YOU FEEL UNSAFE GOING BACK TO THE PLACE WHERE YOU ARE LIVING?: NO

## 2024-08-12 SDOH — SOCIAL STABILITY: SOCIAL INSECURITY: HAVE YOU HAD ANY THOUGHTS OF HARMING ANYONE ELSE?: NO

## 2024-08-12 SDOH — SOCIAL STABILITY: SOCIAL INSECURITY: ARE THERE ANY APPARENT SIGNS OF INJURIES/BEHAVIORS THAT COULD BE RELATED TO ABUSE/NEGLECT?: NO

## 2024-08-12 SDOH — SOCIAL STABILITY: SOCIAL INSECURITY: DOES ANYONE TRY TO KEEP YOU FROM HAVING/CONTACTING OTHER FRIENDS OR DOING THINGS OUTSIDE YOUR HOME?: NO

## 2024-08-12 SDOH — SOCIAL STABILITY: SOCIAL INSECURITY: HAS ANYONE EVER THREATENED TO HURT YOUR FAMILY OR YOUR PETS?: NO

## 2024-08-12 SDOH — HEALTH STABILITY: MENTAL HEALTH: CURRENT SMOKER: 0

## 2024-08-12 SDOH — SOCIAL STABILITY: SOCIAL INSECURITY: ABUSE: ADULT

## 2024-08-12 SDOH — SOCIAL STABILITY: SOCIAL INSECURITY: DO YOU FEEL ANYONE HAS EXPLOITED OR TAKEN ADVANTAGE OF YOU FINANCIALLY OR OF YOUR PERSONAL PROPERTY?: NO

## 2024-08-12 SDOH — SOCIAL STABILITY: SOCIAL INSECURITY: ARE YOU OR HAVE YOU BEEN THREATENED OR ABUSED PHYSICALLY, EMOTIONALLY, OR SEXUALLY BY ANYONE?: NO

## 2024-08-12 SDOH — SOCIAL STABILITY: SOCIAL INSECURITY: WERE YOU ABLE TO COMPLETE ALL THE BEHAVIORAL HEALTH SCREENINGS?: YES

## 2024-08-12 ASSESSMENT — COGNITIVE AND FUNCTIONAL STATUS - GENERAL
DRESSING REGULAR LOWER BODY CLOTHING: A LITTLE
MOVING FROM LYING ON BACK TO SITTING ON SIDE OF FLAT BED WITH BEDRAILS: A LITTLE
MOVING TO AND FROM BED TO CHAIR: A LITTLE
DAILY ACTIVITIY SCORE: 23
PATIENT BASELINE BEDBOUND: NO
MOBILITY SCORE: 21
TURNING FROM BACK TO SIDE WHILE IN FLAT BAD: A LITTLE

## 2024-08-12 ASSESSMENT — LIFESTYLE VARIABLES
HOW OFTEN DO YOU HAVE A DRINK CONTAINING ALCOHOL: NEVER
AUDIT-C TOTAL SCORE: 0
HOW OFTEN DO YOU HAVE 6 OR MORE DRINKS ON ONE OCCASION: NEVER
AUDIT-C TOTAL SCORE: 0
HOW MANY STANDARD DRINKS CONTAINING ALCOHOL DO YOU HAVE ON A TYPICAL DAY: PATIENT DOES NOT DRINK
SKIP TO QUESTIONS 9-10: 1

## 2024-08-12 ASSESSMENT — ACTIVITIES OF DAILY LIVING (ADL)
HEARING - LEFT EAR: FUNCTIONAL
ADEQUATE_TO_COMPLETE_ADL: YES
WALKS IN HOME: INDEPENDENT
TOILETING: INDEPENDENT
FEEDING YOURSELF: INDEPENDENT
GROOMING: INDEPENDENT
LACK_OF_TRANSPORTATION: NO
JUDGMENT_ADEQUATE_SAFELY_COMPLETE_DAILY_ACTIVITIES: YES
DRESSING YOURSELF: INDEPENDENT
PATIENT'S MEMORY ADEQUATE TO SAFELY COMPLETE DAILY ACTIVITIES?: YES
BATHING: INDEPENDENT
HEARING - RIGHT EAR: FUNCTIONAL

## 2024-08-12 ASSESSMENT — PAIN - FUNCTIONAL ASSESSMENT
PAIN_FUNCTIONAL_ASSESSMENT: 0-10

## 2024-08-12 ASSESSMENT — PAIN SCALES - GENERAL
PAINLEVEL_OUTOF10: 7
PAINLEVEL_OUTOF10: 3
PAINLEVEL_OUTOF10: 0 - NO PAIN
PAINLEVEL_OUTOF10: 7

## 2024-08-12 ASSESSMENT — PAIN DESCRIPTION - ORIENTATION
ORIENTATION: LEFT
ORIENTATION: LEFT

## 2024-08-12 ASSESSMENT — COLUMBIA-SUICIDE SEVERITY RATING SCALE - C-SSRS
1. IN THE PAST MONTH, HAVE YOU WISHED YOU WERE DEAD OR WISHED YOU COULD GO TO SLEEP AND NOT WAKE UP?: NO
2. HAVE YOU ACTUALLY HAD ANY THOUGHTS OF KILLING YOURSELF?: NO
6. HAVE YOU EVER DONE ANYTHING, STARTED TO DO ANYTHING, OR PREPARED TO DO ANYTHING TO END YOUR LIFE?: NO

## 2024-08-12 ASSESSMENT — PAIN DESCRIPTION - LOCATION
LOCATION: SHOULDER
LOCATION: SHOULDER

## 2024-08-12 ASSESSMENT — PATIENT HEALTH QUESTIONNAIRE - PHQ9
1. LITTLE INTEREST OR PLEASURE IN DOING THINGS: NOT AT ALL
SUM OF ALL RESPONSES TO PHQ9 QUESTIONS 1 & 2: 0
2. FEELING DOWN, DEPRESSED OR HOPELESS: NOT AT ALL

## 2024-08-12 NOTE — OP NOTE
ROBOT ASSISTED LAPAROSCOPIC GASTRIC BYPASS/EGD/TAP BLOCK Operative Note     Date: 2024  OR Location: [facility]    Name: Ramirez Rodriguez, : 1980, Age: 43 y.o., MRN: [id number], Sex: male    Diagnosis  Pre-op Diagnosis      * Morbid obesity (Multi) [E66.01] Post-op Diagnosis     * Morbid obesity (Multi) [E66.01]     Procedures  ROBOT ASSISTED LAPAROSCOPIC GASTRIC BYPASS/EGD/TAP BLOCK  61988 - WV LAPS GSTR RSTCV PX W/BYP DONNA-EN-Y LIMB <150 CM    WV EGD TRANSORAL BIOPSY SINGLE/MULTIPLE [77074]  Surgeons      * Tiny Wheeler - Primary    Resident/Fellow/Other Assistant:  Surgeons and Role:  * No surgeons found with a matching role *    Procedure Summary  Anesthesia: General  ASA: III  Anesthesia Staff: Anesthesiologist: Hank Mae MD  CRNA: AUDREY Page-CRNA  Estimated Blood Loss: 50 mL  Intra-op Medications: Administrations occurring from 24 1258 to 24 1258:  * No intraprocedure medications in log *           Anesthesia Record               Intraprocedure I/O Totals          Intake    LR bolus 1500.00 mL    metroNIDAZOLE (Flagyl) 500 mg in sodium chloride (iso)  mL 100.00 mL    vancomycin (Vancocin) 2000 mg/500 ml in D5W 500 mL IV piggyback 2,000 mg 500.00 mL    Total Intake 2100 mL       Output    Est. Blood Loss 50 mL    Total Output 50 mL       Net    Net Volume 2050 mL          Specimen: No specimens collected     Staff:   Circulator: Dorie Redd Person: Sincere         Drains and/or Catheters: * None in log *    Tourniquet Times:         Implants:     Findings: Intra-abdominal obesity    Indications: Ramirez Rodriguez is an 43 y.o. male who is having surgery for Morbid obesity (Multi) [E66.01].     The patient was seen in the preoperative area. The risks, benefits, complications, treatment options, non-operative alternatives, expected recovery and outcomes were discussed with the patient. The possibilities of reaction to medication, pulmonary aspiration, injury to  surrounding structures, bleeding, recurrent infection, the need for additional procedures, failure to diagnose a condition, and creating a complication requiring transfusion or operation were discussed with the patient. The patient concurred with the proposed plan, giving informed consent.  The site of surgery was properly noted/marked if necessary per policy. The patient has been actively warmed in preoperative area. Preoperative antibiotics have been ordered and given within 1 hours of incision. Venous thrombosis prophylaxis have been ordered including bilateral sequential compression devices and chemical prophylaxis    Procedure Details: Mr. Rodriguez was scheduled on elective basis for laparoscopic, robotic assisted gastric bypass and related procedures after he met all the preoperative requirements in medical optimization.  His current BMI is more than 47.  On the day of surgery after verification of informed consent and anesthesia evaluation he was given subcu heparin, was taken to the operating room, placed in supine position on the table, SCDs were placed, huddle was done, general anesthesia was established, antibiotics were given, standard, sterile preparation and draping of abdominal wall was performed.  Entry into abdominal cavity was obtained using 5 mm optical trocar in the left midclavicular line without technical problems.  Pneumoperitoneum was established, there was no iatrogenic injuries.  12 mm port was placed in the right axilla  8 mm port to the right side of the umbilical area 8 mm port on the left axillary line and left lobe of liver was retracted with Juan liver retractor.  Entry site trocar was replaced with a 12 mm robotic trocar.  Patient was placed in gentle reverse Trendelenburg position and robot was docked.  Dissection was started in the fat pad at the hiatus.  There was no hiatal hernia.  Lesser sac was entered using blunt dissection and 60 mm blue load was partially fired in  horizontal fashion to start creating gastric pouch.  Patient had excessive fatty adhesions behind the stomach area.  These adhesions were taken down and the a 36 French [place] tube was inserted by anesthesiologist into the gastric pouch along the lesser curve on the outer edge of the tube a white stapler was fired.  After that further dissection was done during this process third arm of the robot stopped working and did not resume functioning despite offering of the robot twice and going through the protocols to recoup the error.  I decided to proceed with rest of the surgery with laparoscopic technique.  Robotic trocars were placed with laparoscopic trocars as needed.  And dissection behind the stomach was continued to the hiatus area and angle of Hiss area.  Additional 60 mm white stapler loads were fired to complete the vertical staple line of the gastric pouch.  A gastrotomy was made at the junction of first and second staple line and an Orvil was inserted by anesthesiologist after removing the gastric tube.  Plastic tubing of the Orvil was removed from the abdominal cavity after satisfactory placement of the anvil into the gastric pouch.  Omentum was split in longitudinal fashion.  Ligament of Treitz was identified small bowel was measured to 75 cm and a window was made in the mesentery and bowel was divided with 60 mm white stapler.  Mesentery was divided with LigaSure and Naomie limb was measured to 125 cm.  Both limbs were placed side-to-side and 2-0 Polysorb stay sutures were placed to keep the orientation.  Enterotomies were made in the antimesenteric border using LigaSure L-hook device.  White stapler was fired in proximal and distal direction through these enterotomies to create a side-to-side anastomosis and then common channel of the enterotomy was closed with 60 mm white stapler in horizontal fashion thus creating a triple stapled jejunojejunostomy without technical problems.  Visual inspection of the  anastomosis was satisfactory and mesenteric defect was closed with running 2-0 Surgidac using Endo Stitch device.  Naomie limb was passed in antecolic antigastric fashion.  Staple end of the Naomie limb was opened and left midclavicular incision was extended and a wound protector was placed and 25/3.5 EEA stapler was inserted through the wound protector into the open end of the proximal Naomie limb and a gastrojejunostomy was created without technical problems.  Redundant end of the candycane was resected with 60 mm white stapler.  Gu space defect was closed with 2-0 Surgidac using Endo Stitch device as well.  Gastroscopy was performed at this point.  Scope was advanced all the way to the gastric pouch and anastomosis was submerged under water.  There was no evidence of any active bleeding.  Anastomosis was patent and there was no air bubbles.  No other technical issues were noted.  Scope was removed fluid was aspirated.  10 cc of Tisseel was sprayed along the gastrojejunostomy staple lines.  19 [place] drain was placed in the left upper quadrant brought out through the 5 mm port on the left axillary line and secured with 3-0 nylon.  Fascial defects at all the 12 mm port sites were closed with interrupted 0 Vicryl sutures using Endo passer under laparoscopic vision.  Tap block was performed on both sides under laparoscopic vision as well.  After final satisfactory examination abdominal cavity there was no active bleeding or any evidence of iatrogenic injuries.  The retractor and trocars were removed.  Pneumoperitoneum was discontinued.  Skin was closed with 3-0 Monocryl.  Dermabond was applied.  Patient tolerated the operation well, was extubated in the OR and transferred to recovery room in stable condition.  Complications:  None; patient tolerated the procedure well.    Disposition: PACU - hemodynamically stable.  Condition: stable         Additional Details: Dr. Ruiz was actively scrubbed and assisted in  the entire laparoscopic and endoscopic components of the operation.  There was no qualified resident available.    Attending Attestation:     Tiny Wheeler  Phone Number: [phone]

## 2024-08-12 NOTE — ANESTHESIA POSTPROCEDURE EVALUATION
Patient: Ramirez Rodriguez    Procedure Summary       Date: 08/12/24 Room / Location: [facility] 09 / [facility]    Anesthesia Start: 0844 Anesthesia Stop:     Procedure: ROBOT ASSISTED LAPAROSCOPIC GASTRIC BYPASS/EGD/TAP BLOCK (Abdomen) Diagnosis:       Morbid obesity (Multi)      (Morbid obesity (Multi) [E66.01])    Surgeons: Tiny Wheeler MD Responsible Provider: Hank Mae MD    Anesthesia Type: general ASA Status: 3            Anesthesia Type: general    Vitals Value Taken Time   /67 08/12/24 1149   Temp 36.5 08/12/24 1151   Pulse 78 08/12/24 1150   Resp 20 08/12/24 1151   SpO2 98 % 08/12/24 1150   Vitals shown include unfiled device data.    Anesthesia Post Evaluation    Patient location during evaluation: PACU  Patient participation: complete - patient participated  Level of consciousness: sleepy but conscious  Pain management: adequate  Airway patency: patent  Cardiovascular status: acceptable  Respiratory status: acceptable and face mask  Hydration status: acceptable  Postoperative Nausea and Vomiting: none        No notable events documented.

## 2024-08-12 NOTE — BRIEF OP NOTE
Date: 2024  OR Location: [facility]    Name: Ramirez Rodriguez, : 1980, Age: 43 y.o., MRN: [id number], Sex: male    Diagnosis  Pre-op Diagnosis      * Morbid obesity (Multi) [E66.01] Post-op Diagnosis     * Morbid obesity (Multi) [E66.01]     Procedures  ROBOT ASSISTED LAPAROSCOPIC GASTRIC BYPASS/EGD/TAP BLOCK  58808 - LA LAPS GSTR RSTCV PX W/BYP DONNA-EN-Y LIMB <150 CM    LA EGD TRANSORAL BIOPSY SINGLE/MULTIPLE [92596]  Surgeons      * Tiny Wheeler - Primary    Resident/Fellow/Other Assistant:   Sean Li - Fellow, assisting     Procedure Summary  Anesthesia: General  ASA: III  Anesthesia Staff: Anesthesiologist: Hank Mae MD  CRNA: AUDREY Page-CRNA    Estimated Blood Loss: 50 mL    Intra-op Medications:   Administrations occurring from 0900 to 1200 on 24:   Medication Name Total Dose   BUPivacaine HCl (Marcaine) 0.25 % (2.5 mg/mL) injection 52 mL   metroNIDAZOLE (Flagyl) 500 mg in sodium chloride (iso)  mL 500 mg   vancomycin (Vancocin) 2000 mg/500 ml in D5W 500 mL IV piggyback 2,000 mg Cannot be calculated   HYDROmorphone (Dilaudid) injection 0.5 mg 0.5 mg              Anesthesia Record               Intraprocedure I/O Totals          Intake    LR bolus 1500.00 mL    metroNIDAZOLE (Flagyl) 500 mg in sodium chloride (iso)  mL 100.00 mL    vancomycin (Vancocin) 2000 mg/500 ml in D5W 500 mL IV piggyback 2,000 mg 500.00 mL    Total Intake 2100 mL       Output    Est. Blood Loss 50 mL    Total Output 50 mL       Net    Net Volume 2050 mL          Specimen: No specimens collected     Staff:   Circulator: Dorie Redd Person: Sincere    Findings: The gastric pouch was created by sequential firings of the Endo CASPER stapler.  The ligament of Treitz was exposed by cephalad retraction of the transverse mesocolon.  The jejunum was run in an antegrade fashion from the ligament of Treitz to the transection point. The jejunojejunostomy was created by 3 firings of the Endo CASPER.   The gastrojejunostomy was created by anastomosis of the Naomie limb to the gastric pouch using an EEA stapler.  The mesenteric defects were closed using Ethibond sutures.  A leak test was performed which was negative.  An esophagogastroduodenoscopy was performed which showed an intact staple line at the anastomosis and normal Naomie limb.  The abdomen was desufflated and the case was terminated.    Complications:  None; patient tolerated the procedure well.     Disposition: PACU - hemodynamically stable.  Condition: stable  Specimens Collected: No specimens collected  Attending Attestation: I was present and scrubbed for the entire procedure.    Tiny Wheeler  Phone Number: [phone]

## 2024-08-12 NOTE — ANESTHESIA PREPROCEDURE EVALUATION
Patient: Ramirez Rodriguez    Procedure Information       Date/Time: 08/12/24 0900    Procedure: ROBOT ASSISTED LAPAROSCOPIC GASTRIC BYPASS    Location: [facility] 09 / [facility]    Surgeons: Tiny Wheeler MD            Relevant Problems   Anesthesia (within normal limits)      Cardiac   (+) Abnormal EKG   (+) Hyperlipidemia, mild   (+) Hypertension      Pulmonary   (+) LYNDSEY treated with BiPAP   (+) Shortness of breath on exertion      Endocrine   (+) Morbid (severe) obesity due to excess calories (Multi)   (+) Obesity, morbid, BMI 50 or higher (Multi)   (+) Type 2 diabetes mellitus with hyperglycemia (Multi)   (+) Type 2 diabetes mellitus without complication, without long-term current use of insulin (Multi)       Clinical information reviewed:    Allergies  Meds               NPO Detail:  NPO/Void Status  Carbohydrate Drink Given Prior to Surgery? : Y  Date of Last Liquid: 08/12/24  Time of Last Liquid: 0545  Date of Last Solid: 08/17/24  Time of Last Solid: 1800  Last Intake Type: Clear fluids         Physical Exam    Airway  Mallampati: III  TM distance: >3 FB  Neck ROM: limited     Cardiovascular   Rhythm: regular  Rate: normal     Dental - normal exam     Pulmonary    Abdominal        Anesthesia Plan    History of general anesthesia?: yes  History of complications of general anesthesia?: no    ASA 3     general     The patient is not a current smoker.  Patient was not previously instructed to abstain from smoking on day of procedure.  Patient did not smoke on day of procedure.    intravenous induction   Anesthetic plan and risks discussed with patient.  Use of blood products discussed with patient who.    Plan discussed with CRNA.

## 2024-08-12 NOTE — ANESTHESIA PROCEDURE NOTES
Airway  Date/Time: 8/12/2024 8:57 AM  Urgency: elective    Difficult airway    Staffing  Performed: CRNA   Authorized by: Hank Mae MD    Performed by: AUDREY Page-MICHELLE  Patient location during procedure: OR    Indications and Patient Condition  Indications for airway management: anesthesia and airway protection  Spontaneous ventilation: present  Sedation level: deep  Preoxygenated: yes  Patient position: sniffing  MILS maintained throughout  Mask difficulty assessment: 3 - difficult mask (inadequate, unstable or two providers) +/- NMBA  Planned trial extubation    Final Airway Details  Final airway type: endotracheal airway      Successful airway: ETT  Cuffed: yes   Successful intubation technique: video laryngoscopy  Facilitating devices/methods: intubating stylet, anterior pressure/BURP and OPA  Endotracheal tube insertion site: oral  Blade: Ruddy  Blade size: #4  ETT size (mm): 8.0  Cormack-Lehane Classification: grade IIb - view of arytenoids or posterior of glottis only  Placement verified by: capnometry   Measured from: lips  Number of attempts at approach: 1  Ventilation between attempts: none  Number of other approaches attempted: 0

## 2024-08-13 ENCOUNTER — APPOINTMENT (OUTPATIENT)
Dept: RADIOLOGY | Facility: HOSPITAL | Age: 44
DRG: 621 | End: 2024-08-13
Payer: COMMERCIAL

## 2024-08-13 LAB
ALBUMIN SERPL BCP-MCNC: 4 G/DL (ref 3.4–5)
ALP SERPL-CCNC: 41 U/L (ref 33–120)
ALT SERPL W P-5'-P-CCNC: 28 U/L (ref 10–52)
ANION GAP SERPL CALC-SCNC: 12 MMOL/L (ref 10–20)
AST SERPL W P-5'-P-CCNC: 19 U/L (ref 9–39)
BASOPHILS # BLD AUTO: 0.02 X10*3/UL (ref 0–0.1)
BASOPHILS NFR BLD AUTO: 0.2 %
BILIRUB SERPL-MCNC: 0.7 MG/DL (ref 0–1.2)
BUN SERPL-MCNC: 11 MG/DL (ref 6–23)
CALCIUM SERPL-MCNC: 9 MG/DL (ref 8.6–10.3)
CHLORIDE SERPL-SCNC: 101 MMOL/L (ref 98–107)
CO2 SERPL-SCNC: 25 MMOL/L (ref 21–32)
CREAT SERPL-MCNC: 0.97 MG/DL (ref 0.5–1.3)
EGFRCR SERPLBLD CKD-EPI 2021: >90 ML/MIN/1.73M*2
EOSINOPHIL # BLD AUTO: 0.08 X10*3/UL (ref 0–0.7)
EOSINOPHIL NFR BLD AUTO: 0.8 %
ERYTHROCYTE [DISTWIDTH] IN BLOOD BY AUTOMATED COUNT: 13.5 % (ref 11.5–14.5)
GLUCOSE BLD MANUAL STRIP-MCNC: 112 MG/DL (ref 74–99)
GLUCOSE BLD MANUAL STRIP-MCNC: 115 MG/DL (ref 74–99)
GLUCOSE BLD MANUAL STRIP-MCNC: 120 MG/DL (ref 74–99)
GLUCOSE BLD MANUAL STRIP-MCNC: 138 MG/DL (ref 74–99)
GLUCOSE BLD MANUAL STRIP-MCNC: 138 MG/DL (ref 74–99)
GLUCOSE SERPL-MCNC: 123 MG/DL (ref 74–99)
HCT VFR BLD AUTO: 37.1 % (ref 41–52)
HGB BLD-MCNC: 12.5 G/DL (ref 13.5–17.5)
IMM GRANULOCYTES # BLD AUTO: 0.04 X10*3/UL (ref 0–0.7)
IMM GRANULOCYTES NFR BLD AUTO: 0.4 % (ref 0–0.9)
LYMPHOCYTES # BLD AUTO: 1.92 X10*3/UL (ref 1.2–4.8)
LYMPHOCYTES NFR BLD AUTO: 19.6 %
MCH RBC QN AUTO: 27.9 PG (ref 26–34)
MCHC RBC AUTO-ENTMCNC: 33.7 G/DL (ref 32–36)
MCV RBC AUTO: 83 FL (ref 80–100)
MONOCYTES # BLD AUTO: 0.84 X10*3/UL (ref 0.1–1)
MONOCYTES NFR BLD AUTO: 8.6 %
NEUTROPHILS # BLD AUTO: 6.92 X10*3/UL (ref 1.2–7.7)
NEUTROPHILS NFR BLD AUTO: 70.4 %
NRBC BLD-RTO: 0 /100 WBCS (ref 0–0)
PLATELET # BLD AUTO: 264 X10*3/UL (ref 150–450)
POTASSIUM SERPL-SCNC: 3.9 MMOL/L (ref 3.5–5.3)
PROT SERPL-MCNC: 6.8 G/DL (ref 6.4–8.2)
RBC # BLD AUTO: 4.48 X10*6/UL (ref 4.5–5.9)
SODIUM SERPL-SCNC: 134 MMOL/L (ref 136–145)
WBC # BLD AUTO: 9.8 X10*3/UL (ref 4.4–11.3)

## 2024-08-13 PROCEDURE — 82947 ASSAY GLUCOSE BLOOD QUANT: CPT

## 2024-08-13 PROCEDURE — 2500000005 HC RX 250 GENERAL PHARMACY W/O HCPCS: Performed by: STUDENT IN AN ORGANIZED HEALTH CARE EDUCATION/TRAINING PROGRAM

## 2024-08-13 PROCEDURE — 2550000001 HC RX 255 CONTRASTS: Performed by: SURGERY

## 2024-08-13 PROCEDURE — 2500000001 HC RX 250 WO HCPCS SELF ADMINISTERED DRUGS (ALT 637 FOR MEDICARE OP): Performed by: SURGERY

## 2024-08-13 PROCEDURE — 85025 COMPLETE CBC W/AUTO DIFF WBC: CPT | Performed by: STUDENT IN AN ORGANIZED HEALTH CARE EDUCATION/TRAINING PROGRAM

## 2024-08-13 PROCEDURE — 1100000001 HC PRIVATE ROOM DAILY

## 2024-08-13 PROCEDURE — 80053 COMPREHEN METABOLIC PANEL: CPT | Performed by: STUDENT IN AN ORGANIZED HEALTH CARE EDUCATION/TRAINING PROGRAM

## 2024-08-13 PROCEDURE — 2500000001 HC RX 250 WO HCPCS SELF ADMINISTERED DRUGS (ALT 637 FOR MEDICARE OP): Performed by: STUDENT IN AN ORGANIZED HEALTH CARE EDUCATION/TRAINING PROGRAM

## 2024-08-13 PROCEDURE — 36415 COLL VENOUS BLD VENIPUNCTURE: CPT | Performed by: STUDENT IN AN ORGANIZED HEALTH CARE EDUCATION/TRAINING PROGRAM

## 2024-08-13 PROCEDURE — 74240 X-RAY XM UPR GI TRC 1CNTRST: CPT

## 2024-08-13 PROCEDURE — 74240 X-RAY XM UPR GI TRC 1CNTRST: CPT | Performed by: STUDENT IN AN ORGANIZED HEALTH CARE EDUCATION/TRAINING PROGRAM

## 2024-08-13 PROCEDURE — 2500000004 HC RX 250 GENERAL PHARMACY W/ HCPCS (ALT 636 FOR OP/ED): Performed by: STUDENT IN AN ORGANIZED HEALTH CARE EDUCATION/TRAINING PROGRAM

## 2024-08-13 RX ORDER — TALC
6 POWDER (GRAM) TOPICAL NIGHTLY
Status: DISCONTINUED | OUTPATIENT
Start: 2024-08-13 | End: 2024-08-14 | Stop reason: HOSPADM

## 2024-08-13 ASSESSMENT — PAIN SCALES - GENERAL
PAINLEVEL_OUTOF10: 7
PAINLEVEL_OUTOF10: 7
PAINLEVEL_OUTOF10: 0 - NO PAIN
PAINLEVEL_OUTOF10: 5 - MODERATE PAIN
PAINLEVEL_OUTOF10: 7
PAINLEVEL_OUTOF10: 5 - MODERATE PAIN
PAINLEVEL_OUTOF10: 0 - NO PAIN
PAINLEVEL_OUTOF10: 4
PAINLEVEL_OUTOF10: 4
PAINLEVEL_OUTOF10: 7
PAINLEVEL_OUTOF10: 6

## 2024-08-13 ASSESSMENT — COGNITIVE AND FUNCTIONAL STATUS - GENERAL
DAILY ACTIVITIY SCORE: 24
MOBILITY SCORE: 24

## 2024-08-13 ASSESSMENT — PAIN DESCRIPTION - DESCRIPTORS
DESCRIPTORS: NAGGING

## 2024-08-13 ASSESSMENT — PAIN - FUNCTIONAL ASSESSMENT
PAIN_FUNCTIONAL_ASSESSMENT: 0-10

## 2024-08-13 NOTE — CARE PLAN
Problem: Skin  Goal: Decreased wound size/increased tissue granulation at next dressing change  Outcome: Progressing  Goal: Participates in plan/prevention/treatment measures  Outcome: Progressing  Goal: Prevent/manage excess moisture  Outcome: Progressing  Goal: Prevent/minimize sheer/friction injuries  Outcome: Progressing  Goal: Promote/optimize nutrition  Outcome: Progressing  Goal: Promote skin healing  Outcome: Progressing     Problem: Pain  Goal: Takes deep breaths with improved pain control throughout the shift  Outcome: Progressing  Goal: Turns in bed with improved pain control throughout the shift  Outcome: Progressing  Goal: Walks with improved pain control throughout the shift  Outcome: Progressing  Goal: Performs ADL's with improved pain control throughout shift  Outcome: Progressing  Goal: Participates in PT with improved pain control throughout the shift  Outcome: Progressing  Goal: Free from opioid side effects throughout the shift  Outcome: Progressing  Goal: Free from acute confusion related to pain meds throughout the shift  Outcome: Progressing     Problem: Fall/Injury  Goal: Not fall by end of shift  Outcome: Progressing  Goal: Be free from injury by end of the shift  Outcome: Progressing  Goal: Verbalize understanding of personal risk factors for fall in the hospital  Outcome: Progressing  Goal: Verbalize understanding of risk factor reduction measures to prevent injury from fall in the home  Outcome: Progressing  Goal: Use assistive devices by end of the shift  Outcome: Progressing  Goal: Pace activities to prevent fatigue by end of the shift  Outcome: Progressing     Problem: Pain - Adult  Goal: Verbalizes/displays adequate comfort level or baseline comfort level  Outcome: Progressing     Problem: Safety - Adult  Goal: Free from fall injury  Outcome: Progressing     Problem: Discharge Planning  Goal: Discharge to home or other facility with appropriate resources  Outcome: Progressing      Problem: Chronic Conditions and Co-morbidities  Goal: Patient's chronic conditions and co-morbidity symptoms are monitored and maintained or improved  Outcome: Progressing     Problem: Diabetes  Goal: Achieve decreasing blood glucose levels by end of shift  Outcome: Progressing  Goal: Increase stability of blood glucose readings by end of shift  Outcome: Progressing  Goal: Decrease in ketones present in urine by end of shift  Outcome: Progressing  Goal: Maintain electrolyte levels within acceptable range throughout shift  Outcome: Progressing  Goal: Maintain glucose levels >70mg/dl to <250mg/dl throughout shift  Outcome: Progressing  Goal: No changes in neurological exam by end of shift  Outcome: Progressing  Goal: Learn about and adhere to nutrition recommendations by end of shift  Outcome: Progressing  Goal: Vital signs within normal range for age by end of shift  Outcome: Progressing  Goal: Increase self care and/or family involovement by end of shift  Outcome: Progressing  Goal: Receive DSME education by end of shift  Outcome: Progressing

## 2024-08-13 NOTE — PROGRESS NOTES
"Ramirez Rodriguez is a 43 y.o. male on day 1 of admission presenting with Obesity, morbid, BMI 50 or higher (Multi).    Subjective   Pain is well controlled. No nausea, vomiting, chest pain or shortness of breath. Ambulated and was sitting in a chair during encounter. Using incentive spirometer    Objective     Physical Exam  Patient is in no acute distress  No respiratory distress  Abdomen is soft, not tender, not distended  No guarding   SHAE drain with serosanguineous output  Incisions are clean dry and intact  Patient is alert and oriented x 3    Last Recorded Vitals  Blood pressure 134/76, pulse 64, temperature 35.8 °C (96.4 °F), temperature source Temporal, resp. rate 16, height 1.88 m (6' 2\"), weight (!) 164 kg (361 lb 9.6 oz), SpO2 98%.  Intake/Output last 3 Shifts:  I/O last 3 completed shifts:  In: 6861.7 (41.8 mL/kg) [P.O.:840; I.V.:3021.7 (18.4 mL/kg); IV Piggyback:3000]  Out: 1220 (7.4 mL/kg) [Urine:1100 (0.2 mL/kg/hr); Drains:70; Blood:50]  Weight: 164 kg     Relevant Results  Results for orders placed or performed during the hospital encounter of 08/12/24 (from the past 24 hour(s))   POCT GLUCOSE   Result Value Ref Range    POCT Glucose 207 (H) 74 - 99 mg/dL   POCT GLUCOSE   Result Value Ref Range    POCT Glucose 138 (H) 74 - 99 mg/dL   POCT GLUCOSE   Result Value Ref Range    POCT Glucose 138 (H) 74 - 99 mg/dL   Comprehensive metabolic panel   Result Value Ref Range    Glucose 123 (H) 74 - 99 mg/dL    Sodium 134 (L) 136 - 145 mmol/L    Potassium 3.9 3.5 - 5.3 mmol/L    Chloride 101 98 - 107 mmol/L    Bicarbonate 25 21 - 32 mmol/L    Anion Gap 12 10 - 20 mmol/L    Urea Nitrogen 11 6 - 23 mg/dL    Creatinine 0.97 0.50 - 1.30 mg/dL    eGFR >90 >60 mL/min/1.73m*2    Calcium 9.0 8.6 - 10.3 mg/dL    Albumin 4.0 3.4 - 5.0 g/dL    Alkaline Phosphatase 41 33 - 120 U/L    Total Protein 6.8 6.4 - 8.2 g/dL    AST 19 9 - 39 U/L    Bilirubin, Total 0.7 0.0 - 1.2 mg/dL    ALT 28 10 - 52 U/L   CBC and Auto Differential "   Result Value Ref Range    WBC 9.8 4.4 - 11.3 x10*3/uL    nRBC 0.0 0.0 - 0.0 /100 WBCs    RBC 4.48 (L) 4.50 - 5.90 x10*6/uL    Hemoglobin 12.5 (L) 13.5 - 17.5 g/dL    Hematocrit 37.1 (L) 41.0 - 52.0 %    MCV 83 80 - 100 fL    MCH 27.9 26.0 - 34.0 pg    MCHC 33.7 32.0 - 36.0 g/dL    RDW 13.5 11.5 - 14.5 %    Platelets 264 150 - 450 x10*3/uL    Neutrophils % 70.4 40.0 - 80.0 %    Immature Granulocytes %, Automated 0.4 0.0 - 0.9 %    Lymphocytes % 19.6 13.0 - 44.0 %    Monocytes % 8.6 2.0 - 10.0 %    Eosinophils % 0.8 0.0 - 6.0 %    Basophils % 0.2 0.0 - 2.0 %    Neutrophils Absolute 6.92 1.20 - 7.70 x10*3/uL    Immature Granulocytes Absolute, Automated 0.04 0.00 - 0.70 x10*3/uL    Lymphocytes Absolute 1.92 1.20 - 4.80 x10*3/uL    Monocytes Absolute 0.84 0.10 - 1.00 x10*3/uL    Eosinophils Absolute 0.08 0.00 - 0.70 x10*3/uL    Basophils Absolute 0.02 0.00 - 0.10 x10*3/uL   POCT GLUCOSE   Result Value Ref Range    POCT Glucose 115 (H) 74 - 99 mg/dL       Assessment/Plan   Principal Problem:    Obesity, morbid, BMI 50 or higher (Multi)  Active Problems:    Morbid (severe) obesity due to excess calories (Multi)    Assessment: This is a 44-year-old man who is postop day 1 status post a laparoscopic Naomie-en-Y gastric bypass.  Patient is doing well and is meeting all milestones upper GI was done today which is essentially unremarkable his diet will be advanced, with potential discharge home tomorrow.    Plan:  - Advance diet according to bariatric protocol, 2 ounces of clear liquids and 2 ounces of full liquids every hour  - Continue ambulation  - Continue incentive spirometry  - DVT prophylaxis  - Tentative discharge home tomorrow after removal of SHAE drain         I spent 30 minutes in the professional and overall care of this patient.    Sean Li MD

## 2024-08-13 NOTE — PROGRESS NOTES
08/13/24 1244   Discharge Planning   Living Arrangements Spouse/significant other   Support Systems Spouse/significant other   Assistance Needed Independent   Type of Residence Private residence   Home or Post Acute Services None   Expected Discharge Disposition Home   Does the patient need discharge transport arranged? No   Patient Choice   Patient / Family choosing to utilize agency / facility established prior to hospitalization No     Care transitions assessment completed via bedside with patient and spouse, Jazmín. TCC introduced self and explained role. Demographics verified. Patient from home with spouse.  Independent PTA. Denies use of assistive devices. Denies SW needs at this time. Patient anticipates no skilled needs at discharge. Patient's family to transport home at time of discharge. TCC to continue to follow for discharge planning needs.      PCP: Dr. Jael Tarango  Last seen: couple days ago   Pharmacy: [facility]   Insurance: [employer]  Dispo: Home w spouse, no needs     GUNJAN CANO, RN TCC

## 2024-08-13 NOTE — CONSULTS
Nutrition Diet Education        Education Documentation  Nutrition Care Manual, taught by Luci Lynn RD at 8/13/2024  8:56 AM.  Learner: Patient  Readiness: Acceptance  Method: Explanation, Demonstration, Teach-back  Response: Verbalizes Understanding      Consult received for bariatric diet instructions.  Pt. is s/p Naomie-en-Y gastric bypass.  Pt. is demonstrating proper completion of hydration monitoring worksheet per bariatric guidelines/protocol.   Reviewed importance of adequate hydration during recovery process.  Pt. had good understanding of this concept.  Pt following with a bariatric RD.     Follow Up:     Time Spent (min): 15 minutes  Last Date of Nutrition Visit: 08/13/24  Nutrition Follow-Up Needed?: None, Dietitian to reassess per policy  Follow up Comment: bariatric education

## 2024-08-13 NOTE — CARE PLAN
The patient's goals for the shift include      The clinical goals for the shift include pain management      Problem: Skin  Goal: Decreased wound size/increased tissue granulation at next dressing change  Outcome: Progressing  Flowsheets (Taken 8/13/2024 0725)  Decreased wound size/increased tissue granulation at next dressing change: Promote sleep for wound healing  Goal: Participates in plan/prevention/treatment measures  Outcome: Progressing  Flowsheets (Taken 8/13/2024 0725)  Participates in plan/prevention/treatment measures: Increase activity/out of bed for meals  Goal: Prevent/manage excess moisture  Outcome: Progressing  Flowsheets (Taken 8/13/2024 0725)  Prevent/manage excess moisture: Follow provider orders for dressing changes  Goal: Prevent/minimize sheer/friction injuries  Outcome: Progressing  Flowsheets (Taken 8/13/2024 0725)  Prevent/minimize sheer/friction injuries: HOB 30 degrees or less  Goal: Promote/optimize nutrition  Outcome: Progressing  Flowsheets (Taken 8/13/2024 0725)  Promote/optimize nutrition: Monitor/record intake including meals  Goal: Promote skin healing  Outcome: Progressing  Flowsheets (Taken 8/13/2024 0725)  Promote skin healing: Turn/reposition every 2 hours/use positioning/transfer devices     Problem: Pain  Goal: Takes deep breaths with improved pain control throughout the shift  Outcome: Progressing  Goal: Turns in bed with improved pain control throughout the shift  Outcome: Progressing  Goal: Walks with improved pain control throughout the shift  Outcome: Progressing  Goal: Performs ADL's with improved pain control throughout shift  Outcome: Progressing  Goal: Participates in PT with improved pain control throughout the shift  Outcome: Progressing  Goal: Free from opioid side effects throughout the shift  Outcome: Progressing  Goal: Free from acute confusion related to pain meds throughout the shift  Outcome: Progressing     Problem: Fall/Injury  Goal: Not fall by end of  shift  Outcome: Progressing  Goal: Be free from injury by end of the shift  Outcome: Progressing  Goal: Verbalize understanding of personal risk factors for fall in the hospital  Outcome: Progressing  Goal: Verbalize understanding of risk factor reduction measures to prevent injury from fall in the home  Outcome: Progressing  Goal: Use assistive devices by end of the shift  Outcome: Progressing  Goal: Pace activities to prevent fatigue by end of the shift  Outcome: Progressing     Problem: Pain - Adult  Goal: Verbalizes/displays adequate comfort level or baseline comfort level  Outcome: Progressing     Problem: Safety - Adult  Goal: Free from fall injury  Outcome: Progressing     Problem: Discharge Planning  Goal: Discharge to home or other facility with appropriate resources  Outcome: Progressing     Problem: Chronic Conditions and Co-morbidities  Goal: Patient's chronic conditions and co-morbidity symptoms are monitored and maintained or improved  Outcome: Progressing     Problem: Diabetes  Goal: Achieve decreasing blood glucose levels by end of shift  Outcome: Progressing  Goal: Increase stability of blood glucose readings by end of shift  Outcome: Progressing  Goal: Decrease in ketones present in urine by end of shift  Outcome: Progressing  Goal: Maintain electrolyte levels within acceptable range throughout shift  Outcome: Progressing  Goal: Maintain glucose levels >70mg/dl to <250mg/dl throughout shift  Outcome: Progressing  Goal: No changes in neurological exam by end of shift  Outcome: Progressing  Goal: Learn about and adhere to nutrition recommendations by end of shift  Outcome: Progressing  Goal: Vital signs within normal range for age by end of shift  Outcome: Progressing  Goal: Increase self care and/or family involovement by end of shift  Outcome: Progressing  Goal: Receive DSME education by end of shift  Outcome: Progressing

## 2024-08-14 ENCOUNTER — PHARMACY VISIT (OUTPATIENT)
Dept: PHARMACY | Facility: CLINIC | Age: 44
End: 2024-08-14
Payer: COMMERCIAL

## 2024-08-14 VITALS
TEMPERATURE: 97 F | HEART RATE: 55 BPM | DIASTOLIC BLOOD PRESSURE: 90 MMHG | SYSTOLIC BLOOD PRESSURE: 156 MMHG | WEIGHT: 315 LBS | HEIGHT: 74 IN | RESPIRATION RATE: 18 BRPM | BODY MASS INDEX: 40.43 KG/M2 | OXYGEN SATURATION: 97 %

## 2024-08-14 PROBLEM — Z98.84 STATUS POST GASTRIC BYPASS FOR OBESITY: Status: ACTIVE | Noted: 2024-08-14

## 2024-08-14 PROBLEM — Z98.84 STATUS POST GASTRIC BYPASS FOR OBESITY: Status: ACTIVE | Noted: 2024-08-12

## 2024-08-14 LAB
GLUCOSE BLD MANUAL STRIP-MCNC: 106 MG/DL (ref 74–99)
GLUCOSE BLD MANUAL STRIP-MCNC: 97 MG/DL (ref 74–99)

## 2024-08-14 PROCEDURE — 2500000004 HC RX 250 GENERAL PHARMACY W/ HCPCS (ALT 636 FOR OP/ED): Performed by: STUDENT IN AN ORGANIZED HEALTH CARE EDUCATION/TRAINING PROGRAM

## 2024-08-14 PROCEDURE — 5A09357 ASSISTANCE WITH RESPIRATORY VENTILATION, LESS THAN 24 CONSECUTIVE HOURS, CONTINUOUS POSITIVE AIRWAY PRESSURE: ICD-10-PCS | Performed by: STUDENT IN AN ORGANIZED HEALTH CARE EDUCATION/TRAINING PROGRAM

## 2024-08-14 PROCEDURE — 2500000004 HC RX 250 GENERAL PHARMACY W/ HCPCS (ALT 636 FOR OP/ED): Performed by: SURGERY

## 2024-08-14 PROCEDURE — 82947 ASSAY GLUCOSE BLOOD QUANT: CPT

## 2024-08-14 PROCEDURE — 2500000001 HC RX 250 WO HCPCS SELF ADMINISTERED DRUGS (ALT 637 FOR MEDICARE OP): Performed by: SURGERY

## 2024-08-14 PROCEDURE — 2500000001 HC RX 250 WO HCPCS SELF ADMINISTERED DRUGS (ALT 637 FOR MEDICARE OP): Performed by: STUDENT IN AN ORGANIZED HEALTH CARE EDUCATION/TRAINING PROGRAM

## 2024-08-14 ASSESSMENT — PAIN SCALES - GENERAL
PAINLEVEL_OUTOF10: 7
PAINLEVEL_OUTOF10: 4
PAINLEVEL_OUTOF10: 0 - NO PAIN
PAINLEVEL_OUTOF10: 8

## 2024-08-14 ASSESSMENT — PAIN - FUNCTIONAL ASSESSMENT
PAIN_FUNCTIONAL_ASSESSMENT: 0-10
PAIN_FUNCTIONAL_ASSESSMENT: 0-10

## 2024-08-14 ASSESSMENT — COGNITIVE AND FUNCTIONAL STATUS - GENERAL
DAILY ACTIVITIY SCORE: 24
MOBILITY SCORE: 24

## 2024-08-14 ASSESSMENT — PAIN DESCRIPTION - DESCRIPTORS
DESCRIPTORS: ACHING
DESCRIPTORS: ACHING

## 2024-08-14 NOTE — PROGRESS NOTES
BARIATRIC SURGERY CLINIC  FOLLOW UP NOTE      Name: Ramirez Rodriguez  MRN: [id number]      Index Surgery  Date of Surgery: 8/12/24   Surgeon: Dr. Wheeler     Surgical Procedure: Laparoscopic mikel en y gastric bypass 67132    HPI:   Presenting for follow up visit 1 week s/p mikel-en-Y gastric bypass on 8/12/2024.     Diet: Liquid     Exercise: walking    Concerns related to:  Nausea/Vomiting, Reflux: denies  Abdominal Pain: as expected, controlled with tylenol  Diarrhea/Constipation denies    DAILY SUPPLEMENTS:  Calcium: Calcium Citrate w/ vitamin D (1200 - 1500mg)  Multivitamin & Minerals: 2 per day  Vitamin B12: 500 mcg/day   PPI: Omeprazole      Current Outpatient Medications   Medication Sig Dispense Refill    albuterol 90 mcg/actuation inhaler INHALE 2 PUFFS BY MOUTH PRIOR TO EXERTION/ EXERCISE AND EVERY 4 TO 6 HOURS AS NEEDED FOR COUGH OR SHORTNESS OF BREATH      calcium citrate-vitamin D3 500 mg-12.5 mcg (500 unit) tablet,chewable Chew 1 tablet 3 times a day.      cyanocobalamin (Vitamin B-12) 500 mcg tablet Take 1 tablet (500 mcg) by mouth once daily.      DULoxetine (Cymbalta) 60 mg DR capsule Take 1 capsule (60 mg) by mouth once daily. 90 capsule 1    enoxaparin (Lovenox) 60 mg/0.6 mL syringe Inject 0.6 mL (60 mg) under the skin once daily for 28 days. 28 each 0    ferrous sulfate, 325 mg ferrous sulfate, tablet Take 1 tablet by mouth once daily with breakfast. Celebrate vitamins; chewable 30mg Iron + vitamin C      metoprolol ta-hydrochlorothiaz (Lopressor HCT) 100-25 mg tablet TAKE 1 TABLET BY MOUTH ONCE DAILY 90 tablet 1    multivit-min/iron/folic acid/K (BARIATRIC MULTIVITAMINS ORAL) Take 1 tablet by mouth 1 time. Bariatric advantage chewable      omeprazole (PriLOSEC) 40 mg DR capsule Take 1 capsule (40 mg) by mouth once daily in the morning. Take before meals. Do not crush or chew. Open capsule, sprinkle beads on Sugar Free jello, pudding or applesauce. 90 capsule 1    ondansetron ODT (Zofran-ODT) 4 mg  disintegrating tablet Dissolve 1 tablet (4 mg) by mouth every 6 hours if needed for nausea or vomiting. 60 tablet 1    dulaglutide (Trulicity) 3 mg/0.5 mL pen injector Inject 3 mg under the skin 1 (one) time per week. (Patient not taking: Reported on 8/22/2024) 2 mL 1    glipiZIDE (Glucotrol) 5 mg tablet Take 1 tablet (5 mg) by mouth 2 times a day before meals. (Patient not taking: Reported on 8/22/2024) 60 tablet 5    lisinopril 20 mg tablet TAKE 1 TABLET BY MOUTH ONCE DAILY (Patient not taking: Reported on 8/22/2024) 90 tablet 0    meloxicam (Mobic) 15 mg tablet TAKE 1 TABLET BY MOUTH ONCE DAILY (Patient not taking: Reported on 8/22/2024) 90 tablet 0    metFORMIN (Glucophage) 1,000 mg tablet Take 1 tablet (1,000 mg) by mouth 2 times a day. (Patient not taking: Reported on 8/22/2024) 60 tablet 5     No current facility-administered medications for this visit.       Comorbidities:  Patient Active Problem List   Diagnosis    Adjustment disorder, unspecified    Hypertension    LYNDSEY treated with BiPAP    Type 2 diabetes mellitus without complication, without long-term current use of insulin (Multi)    Shortness of breath on exertion    Hip pain, right    Type 2 diabetes mellitus with hyperglycemia (Multi)    Hyperlipidemia, mild    Abnormal EKG    Pre-op evaluation    Obesity, morbid, BMI 50 or higher (Multi)    Bariatric surgery status    Morbid (severe) obesity due to excess calories (Multi)    Status post gastric bypass for obesity         REVIEW OF SYSTEMS:  CONSTITUTIONAL: Patient denies fevers, chills, sweats and weight changes.  CARDIOVASCULAR: Patient denies chest pains, palpitations, orthopnea and paroxysmal nocturnal dyspnea.  RESPIRATORY: No dyspnea on exertion, no wheezing or cough.  GI: No nausea, vomiting, diarrhea, constipation, abdominal pain, hematochezia or melena.  MUSCULOSKELETAL: No myalgias or arthralgias.  NEUROLOGIC: No chronic headaches, no seizures. Patient denies numbness, tingling or  "weakness.  PSYCHIATRIC: Patient denies problems with mood disturbance. No problems with anxiety.  ENDOCRINE: No excessive urination or excessive thirst.  DERMATOLOGIC: Patient denies any rashes or skin changes.    PHYSICAL EXAM:  /74 (BP Location: Left arm, Patient Position: Sitting, BP Cuff Size: Large adult long)   Pulse 58   Temp 36.8 °C (98.3 °F) (Oral)   Ht 1.88 m (6' 2\")   Wt (!) 156 kg (344 lb 4.8 oz)   SpO2 97%   BMI 44.21 kg/m²   Alert, well appearing, no acute distress, nourished, hydrated.  Anicteric sclera, no ptosis  Facial symmetry  Unlabored respirations.  Easily conversant without increased respiratory effort  Oriented to person, place, time.  Judgement intact.  Appropriate mood, affect.   Abdomen soft, nondistended, nontender, incisions are healing well  No peripheral edema    ASSESSMENT & PLAN:  43 y.o. male presenting for follow up visit 1 week s/p mikel-en-Y gastric bypass.      No acute post bariatric surgery complications  No acute issues or concerns presented today.  Tolerating diet with appropriate protein and fluid intake  Taking appropriate supplements  Bowel regularity regular  Exercise walking  Doing Lovenox shots  Weight Loss: Initial  -> Current   Wt Readings from Last 1 Encounters:   08/22/24 (!) 156 kg (344 lb 4.8 oz)       Plan:  Complete Lovenox as prescribed  -Continue to follow protein forward, reduced calorie, whole foods based diet, follow diet direction of bariatric RD  -Continue to participate in regular exercise with goal to achieve 200-300 minutes per week of aerobic & resistance training for preservation of lean body mass.  -Continue multivitamin and supplements to support micronutrient needs  -Ongoing need for anti reflux medications discussed and continued if appropriate - see orders.  -Bowel hygiene reviewed, encouraged adequate fluids, increased dietary fiber and reviewed as needed use of over the counter treatments to promote bowel support.   -Labs - see " orders  Follow-up with PCP regarding adjustment of blood pressure and diabetes medications.    Follow up as scheduled in 5 weeks with CORI Velazquez.

## 2024-08-14 NOTE — PROGRESS NOTES
"Ramirez Rodriguez is a 43 y.o. male on day 2 of admission presenting with Obesity, morbid, BMI 50 or higher (Multi).    Subjective   No acute events overnight. Pain is well controlled. No nausea, vomiting, chest pain or shortness of breath.     Physical Exam    Patient is in no acute distress  No respiratory distress  Abdomen is soft, not tender, not distended  No guarding   SHAE drain with serosanguineous output  Incisions are clean dry and intact  Patient is alert and oriented x 3    Last Recorded Vitals  Blood pressure 137/81, pulse (!) 45, temperature 36.1 °C (97 °F), temperature source Temporal, resp. rate 18, height 1.88 m (6' 2\"), weight (!) 164 kg (361 lb 9.6 oz), SpO2 98%.  Intake/Output last 3 Shifts:  I/O last 3 completed shifts:  In: 4557.5 (27.8 mL/kg) [P.O.:1860; I.V.:1797.5 (11 mL/kg); IV Piggyback:900]  Out: 4780 (29.1 mL/kg) [Urine:4700 (0.8 mL/kg/hr); Drains:80]  Weight: 164 kg     Relevant Results    Results for orders placed or performed during the hospital encounter of 08/12/24 (from the past 24 hour(s))   POCT GLUCOSE   Result Value Ref Range    POCT Glucose 115 (H) 74 - 99 mg/dL   POCT GLUCOSE   Result Value Ref Range    POCT Glucose 112 (H) 74 - 99 mg/dL   POCT GLUCOSE   Result Value Ref Range    POCT Glucose 120 (H) 74 - 99 mg/dL   POCT GLUCOSE   Result Value Ref Range    POCT Glucose 97 74 - 99 mg/dL       Assessment/Plan   Principal Problem:    Obesity, morbid, BMI 50 or higher (Multi)  Active Problems:    Morbid (severe) obesity due to excess calories (Multi)    Assessment: This is a 44-year-old man who is postop day 2 status post laparoscopic Naomie-en-Y gastric bypass. Patient is doing well and is meeting all milestones. Will be advanced to POD2 bariatric diet, with potential discharge home today.     Plan:  - Advance diet according to bariatric protocol, 4 ounces of clear liquids and 4 ounces of full liquids every hour  - SHAE drain removed at bedside  - Continue incentive spirometry  - DVT " prophylaxis  - Discharge home at 1700         I spent 30 minutes in the professional and overall care of this patient.    Sean Li MD

## 2024-08-14 NOTE — DISCHARGE SUMMARY
Discharge Diagnosis  Obesity, morbid, BMI 50 or higher (Multi)    Issues Requiring Follow-Up  None     Test Results Pending At Discharge  Pending Labs       No current pending labs.          Hospital Course  Patient presented to the hospital for scheduled gastric bypass. The procedure was carried out without complication. No major events POD#0. POD#1 esophogram was negative for signs of leak or obstruction. Diet advanced to maximum of 4 oz per hour without issue. POD#2 diet was advanced to maximum of 8 oz per hour without issue. No other major events. Discharged home on POD#2 once tolerating 5-8 oz of liquid diet per hour. Patient did well post operatively overall.    Pertinent Physical Exam At Time of Discharge    Patient is in no acute distress  No respiratory distress  Abdomen is soft, not tender, not distended  No guarding   SHAE drain with serosanguineous output  Incisions are clean dry and intact  Patient is alert and oriented x 3  Home Medications     Medication List      CONTINUE taking these medications     albuterol 90 mcg/actuation inhaler   DULoxetine 60 mg DR capsule; Commonly known as: Cymbalta; Take 1 capsule   (60 mg) by mouth once daily.   enoxaparin 60 mg/0.6 mL syringe; Commonly known as: Lovenox; Inject 0.6   mL (60 mg) under the skin once daily for 28 days.   glipiZIDE 5 mg tablet; Commonly known as: Glucotrol; Take 1 tablet (5   mg) by mouth 2 times a day before meals.   lisinopril 20 mg tablet; TAKE 1 TABLET BY MOUTH ONCE DAILY   meloxicam 15 mg tablet; Commonly known as: Mobic; TAKE 1 TABLET BY MOUTH   ONCE DAILY   metFORMIN 1,000 mg tablet; Commonly known as: Glucophage; Take 1 tablet   (1,000 mg) by mouth 2 times a day.   metoprolol ta-hydrochlorothiaz 100-25 mg tablet; Commonly known as:   Lopressor HCT; TAKE 1 TABLET BY MOUTH ONCE DAILY   omeprazole 40 mg DR capsule; Commonly known as: PriLOSEC; Take 1 capsule   (40 mg) by mouth once daily in the morning. Take before meals. Do not   crush  or chew. Open capsule, sprinkle beads on Sugar Free jello, pudding   or applesauce.   ondansetron ODT 4 mg disintegrating tablet; Commonly known as:   Zofran-ODT; Dissolve 1 tablet (4 mg) by mouth every 6 hours if needed for   nausea or vomiting.   oxyCODONE 5 mg/5 mL solution; Commonly known as: Roxicodone; Take 5 mL   (5 mg) by mouth every 6 hours if needed for severe pain (7 - 10) or   moderate pain (4 - 6) for up to 7 days.   Trulicity 3 mg/0.5 mL pen injector; Generic drug: dulaglutide; Inject 3   mg under the skin 1 (one) time per week.     STOP taking these medications     dilTIAZem  mg 24 hr capsule; Commonly known as: Cardizem CD     Outpatient Follow-Up  Future Appointments   Date Time Provider Department [place]   8/22/2024  9:45 AM Karis Kirk RD [id number] [place]   8/22/2024 10:00 AM Tiny Wheeler MD [id number] [place]   9/16/2024  3:30 PM Karis Kirk RD [id number] [place]   9/16/2024  4:00 PM AUDREY [facility] [id number] [place]   10/24/2024  3:00 PM Jael Tarango MD [id number] [facility]   11/8/2024  1:00 PM Katerina Luke RD [id number] [place]   11/8/2024  3:30 PM AUDREY [facility] [id number] [place]       Sean Li MD

## 2024-08-14 NOTE — DISCHARGE INSTRUCTIONS
PLEASE NOTE THE FOLLOWING CHANGES IN YOUR MEDICATION REGIMEN:    We discontinued your Cardizem CD medication. Please follow up with your Cardiologist ASAP to discuss the need for this medication    If you are diabetic:  - please check your blood sugar daily  - If your blood glucose is repeatedly over 200, contact your PCP to help guide your future management of your blood glucose  - If your blood glucose is less than 60 or you have symptoms of hypoglycemia (light headed, weak), take glucose or drink a cup of juice. If this happens repeatedly, stop all diabetes medication and contact your PCP or endocrinologist.    Medications:  - Medications should be crushed and mixed with full liquids. Capsules may be opened and mixed with full liquids.  - Extended release medications should not be taken. Please contact your primary care physician to convert extended release medications to immediate release form. You may continue to take Cymbalta  - Take 650mg Tylenol with the tylenol you can take tablets (plain white tablets and cut into smaller pieces or crush in applesauce) or Tylenol powder (new product, 500mg per packet, dissolves in the mouth and does not need water every 6 hours for pain. Take between doses of your opioid pain medication. Try to limit the use of your opioid pain medication and only take as needed.  - Slowly add your vitamins to your daily medication regimen as tolerated. You do not have to take them within the first few days after surgery if they are causing you nausea or other problems.   - If you have medications that you still cannot tolerate by your first visit with your surgeon or dietitian, please discuss this.   - You should be receiving or already have received the following medications for your postoperative course: a proton pump inhibitor for acid suppression (omeprazole, esomeprazole, pantoprazole), antinausea medication (ondansetron, metoclopramide), and pain medication (oxycodone, morphine,  hydromorphone, hydrocodone). If you are missing any of these, please call the office immediately so we can prescribe them for you.  - OPEN UP OMEPRAZOLE CAPSULES AND SPRINKLE THEM IN WATER PRIOR TO TAKING. IF YOU HAD BARIATRIC SURGERY, YOU WILL CONTINUE THIS MEDICATION FOR AT LEAST 6 MONTHS.      Constipation  - Take fiber (benefiber or metamucil) twice daily. Please also take colace (docusate) twice a day while taking oxycodone or other opiates. If you remain constipated despite these medications, please take milk of magnesia and call the office to notify us.      Activity instructions:   - No lifting, pulling, or pushing objects greater than 15 pounds for 4 weeks.  - Activity otherwise as tolerated.   - You may shower. Soap and water may run over your incisions. Pat dry. No submerging in baths or  swimming (activities that keep your incisions underwater) for at least two weeks.    - You may not drive while taking narcotics.     Diet:   - You will go home on a full liquid diet (similar to the diet you were on your final day in the hospital)  - You will stay on this full liquid diet for two weeks. Acceptable full liquids include protein shakes, sugar free jello, sugar free pudding, and creamy soups (no chunks). You will continue to drink clear liquids including water and crystal light. You should aim for 64 ounces of fluid per day, with about half of this being full liquids and half of this being clear liquids. Do not exceed 8 oz per hour.  - After two weeks, you should have a discussion with the dietician about progressing to a combination of full liquid and pureed foods.   - Follow a healthy bariatric diet. Target 5 meals per day (3 mid size meals and two small meals). Avoid concentrated sweets (candy, soda) and limit carbohydrate intake with a preference for healthy proteins (lean meats, beans  - For further information, follow the diet instructions listed in your bariatric surgery instruction packet.     Call  Provider If:  - Breathing faster or harder than normal.   - Fever of 100.4 F (38 C) or higher or feeling of chills.   - Feeling very sleepy and difficult to awaken.   - Inability to drink or significant decrease in ability to drink since discharge.   - Vomiting (throwing up) and not able to eat or drink for 12 hours.   - Urinating much less than your normal.  - More than 4 loose, watery bowel movements in 24 hours (diarrhea).   - Any new concerning symptoms.

## 2024-08-14 NOTE — CARE PLAN
The patient's goals for the shift include  pain management.    The clinical goals for the shift include Pain management

## 2024-08-14 NOTE — CARE PLAN
The patient's goals for the shift include      The clinical goals for the shift include pain management      Problem: Skin  Goal: Decreased wound size/increased tissue granulation at next dressing change  Outcome: Progressing  Flowsheets (Taken 8/14/2024 0704)  Decreased wound size/increased tissue granulation at next dressing change:   Protective dressings over bony prominences   Promote sleep for wound healing  Goal: Participates in plan/prevention/treatment measures  Outcome: Progressing  Flowsheets (Taken 8/14/2024 0704)  Participates in plan/prevention/treatment measures: Discuss with provider PT/OT consult  Goal: Prevent/manage excess moisture  Outcome: Progressing  Flowsheets (Taken 8/14/2024 0704)  Prevent/manage excess moisture: Moisturize dry skin  Goal: Prevent/minimize sheer/friction injuries  Outcome: Progressing  Flowsheets (Taken 8/14/2024 0704)  Prevent/minimize sheer/friction injuries: HOB 30 degrees or less  Goal: Promote/optimize nutrition  Outcome: Progressing  Flowsheets (Taken 8/14/2024 0704)  Promote/optimize nutrition: Offer water/supplements/favorite foods  Goal: Promote skin healing  Outcome: Progressing  Flowsheets (Taken 8/14/2024 0704)  Promote skin healing: Rotate device position/do not position patient on device     Problem: Pain  Goal: Takes deep breaths with improved pain control throughout the shift  Outcome: Progressing  Goal: Turns in bed with improved pain control throughout the shift  Outcome: Progressing  Goal: Walks with improved pain control throughout the shift  Outcome: Progressing  Goal: Performs ADL's with improved pain control throughout shift  Outcome: Progressing  Goal: Participates in PT with improved pain control throughout the shift  Outcome: Progressing  Goal: Free from opioid side effects throughout the shift  Outcome: Progressing  Goal: Free from acute confusion related to pain meds throughout the shift  Outcome: Progressing     Problem: Fall/Injury  Goal: Not  fall by end of shift  Outcome: Progressing  Goal: Be free from injury by end of the shift  Outcome: Progressing  Goal: Verbalize understanding of personal risk factors for fall in the hospital  Outcome: Progressing  Goal: Verbalize understanding of risk factor reduction measures to prevent injury from fall in the home  Outcome: Progressing  Goal: Use assistive devices by end of the shift  Outcome: Progressing  Goal: Pace activities to prevent fatigue by end of the shift  Outcome: Progressing     Problem: Pain - Adult  Goal: Verbalizes/displays adequate comfort level or baseline comfort level  Outcome: Progressing  Flowsheets (Taken 8/14/2024 0704)  Verbalizes/displays adequate comfort level or baseline comfort level: Implement non-pharmacological measures as appropriate and evaluate response     Problem: Safety - Adult  Goal: Free from fall injury  Outcome: Progressing     Problem: Discharge Planning  Goal: Discharge to home or other facility with appropriate resources  Outcome: Progressing  Flowsheets (Taken 8/14/2024 0704)  Discharge to home or other facility with appropriate resources: Identify barriers to discharge with patient and caregiver     Problem: Chronic Conditions and Co-morbidities  Goal: Patient's chronic conditions and co-morbidity symptoms are monitored and maintained or improved  Outcome: Progressing  Flowsheets (Taken 8/14/2024 0704)  Care Plan - Patient's Chronic Conditions and Co-Morbidity Symptoms are Monitored and Maintained or Improved: Collaborate with multidisciplinary team to address chronic and comorbid conditions and prevent exacerbation or deterioration     Problem: Diabetes  Goal: Achieve decreasing blood glucose levels by end of shift  Outcome: Progressing  Flowsheets (Taken 8/14/2024 0704)  Achieve decreasing blood glucose levels by end of shift: Self monitor blood glucose with staff oversight  Goal: Increase stability of blood glucose readings by end of shift  Outcome:  Progressing  Flowsheets (Taken 8/14/2024 0704)  Increase stability of blood glucose readings by end of shift: Med administration/monitoring of effect  Goal: Decrease in ketones present in urine by end of shift  Outcome: Progressing  Flowsheets (Taken 8/14/2024 0704)  Decrease in ketones present in urine by end of shift: Med administration/monitoring of effect  Goal: Maintain electrolyte levels within acceptable range throughout shift  Outcome: Progressing  Flowsheets (Taken 8/14/2024 0704)  Maintain electrolyte levels within acceptable range throughout shift: Med administration/monitoring of effect  Goal: Maintain glucose levels >70mg/dl to <250mg/dl throughout shift  Outcome: Progressing  Flowsheets (Taken 8/14/2024 0704)  Maintain glucose levels >70mg/dl to <250mg/dl throughout shift: Self monitor blood glucose with staff oversight  Goal: No changes in neurological exam by end of shift  Outcome: Progressing  Flowsheets (Taken 8/14/2024 0704)  No changes in neurological exam by end of shift: Complete frequent neurological assessments  Goal: Learn about and adhere to nutrition recommendations by end of shift  Outcome: Progressing  Flowsheets (Taken 8/14/2024 0704)  Learn about and adhere to nutrition recommendations by end of shift: Ensure/encourage compliance with appropriate diet  Goal: Vital signs within normal range for age by end of shift  Outcome: Progressing  Flowsheets (Taken 8/14/2024 0704)  Vital signs within normal range for age by end of shift: Med administration/monitoring of effect  Goal: Increase self care and/or family involovement by end of shift  Outcome: Progressing  Flowsheets (Taken 8/14/2024 0704)  Increase self care and/or family involovement by end of shift: Self monitor blood glucose with staff oversight  Goal: Receive DSME education by end of shift  Outcome: Progressing  Flowsheets (Taken 8/14/2024 0704)  Receive DSME education by end of shift: Provide patient centered education on Diabetic  Self Management Education

## 2024-08-15 ENCOUNTER — TELEPHONE (OUTPATIENT)
Dept: SURGERY | Facility: CLINIC | Age: 44
End: 2024-08-15
Payer: COMMERCIAL

## 2024-08-15 ENCOUNTER — APPOINTMENT (OUTPATIENT)
Dept: SURGERY | Facility: CLINIC | Age: 44
End: 2024-08-15
Payer: COMMERCIAL

## 2024-08-15 NOTE — PROGRESS NOTES
Surgery Date:   8/12/24  Surgeon:  Liam  Procedure:  gastric bypass    ASSESSMENT:    Current weight pounds:  344.3  Ht:   73.5in.        BMI: 44.2  Previous weight pounds:   361.6  Initial start weight pounds:  376.0   11/2/23  EBW pounds:  180.0  Total weight change pounds: 31.7  %EBW Lost: 17.6%    PROGRESS:    Nutrition Interventions for last encounter (date):   1. Drink 64 oz of fluid daily  2. Drink enough protein shakes to meet your goal of 60-70 g of protein per day  3. Take 2 chewable multivitamins, 1409-1553 mg of calcium citrate, and 500 mcg of B12 daily  4. Get up and walk frequently throughout the day.     CHANGES IN TREATMENT:   Patient met goals:  Yes     24 hour food recall:  Pt on a full liquid diet; 60 g protein and 64-80 oz fluid daily  Beverages:   protein shakes, Propel, broth, Jello  Alcohol:  none      Vitamins:  1 Equate  MVI, 1500 mg BA Calcium, and B12  Physical Activity:   walking     READINESS TO LEARN:  Motivation to learn:           Interested      Understanding of instruction: Good   Anticipated Compliance:   Good      Family Support: Pt's wife present and supportive.     Patient presents with post-op weight loss surgery gastric bypass. The pt is 1 week post op. Patient has lost 31.7 pounds since initial assessment accounting for 17.6% loss excess body weight.  Patient tolerating a full liquid  diet.  Protein intake is  adequate for post-op individual. Fluid consumption is adequate. Patient is  supplementing recommended vitamin/minerals. Pt states no concerns and/or difficulties.   Reviewed the puree diet to start today.     Malnutrition Screening  Significant unintentional weight loss? n/a  Eating less than 75% of usual intake for more than 2 weeks? n/a      Nutrition Diagnosis:   1. Increased protein and nutrition needs related to altered GI function as evidenced by pt. s/p gastric bypass.  2. Food- and nutrition-related knowledge deficit related to lack of prior exposure to  surgical weight loss information as evidenced by diet recall.     Nutrition Interventions:   1. Modify type and amount of food and nutrients within meals and snacks.  2. Comprehensive Nutrition Education  -Nutrition education materials: Support Group Schedule       Recommendations:    1. Continue to drink your protein shakes to meet your goal of 60-70 g of protein per day. Begin measuring how much protein you can eat on the soft diet so that you know when to start weaning off of your protein shakes.   2. Continue to drink 64 oz. of zero calorie beverages per day  3. Begin  no drinking 30 min before, during the meal and for 30 minutes after the meal when you start the puree diet on   4. Continue to exercise   5. Advance to the puree diet today for 2 weeks, then soft food  on f9/5 or 2 weeks.  if you do not tolerate the puree diet, go back to the liquid diet for a few more days and then try puree again.  I will call you on 9/4 to review the soft diet before you start it.   6. Remember to eat slowly and chew thoroughly  7. Try one new food at a time to test for any intolerances.   8.          Take 1 Equate multi 2x/day.  Take 1 calcium chew 3x/day.  For more details see pages 6 and 7 in your nutrition guidelines booklet.   9.             Attend monthly support groups      Nutrition Monitoring and Evaluation:   1-2 pounds weight loss per week  Criteria: weight check, food recall  Need for Follow-up: 6 weeks post op       Karis PRICE, [facility]  Bariatric Surgery Dietitian  Phone: [phone]  Fax: [phone]

## 2024-08-15 NOTE — TELEPHONE ENCOUNTER
Outbound call to patient to check in on patient post-operatively. No answer. Unable to leave message due to mail box being full.

## 2024-08-22 ENCOUNTER — NUTRITION (OUTPATIENT)
Dept: SURGERY | Facility: CLINIC | Age: 44
End: 2024-08-22
Payer: COMMERCIAL

## 2024-08-22 ENCOUNTER — OFFICE VISIT (OUTPATIENT)
Dept: SURGERY | Facility: CLINIC | Age: 44
End: 2024-08-22
Payer: COMMERCIAL

## 2024-08-22 VITALS
HEIGHT: 74 IN | WEIGHT: 315 LBS | TEMPERATURE: 98.3 F | SYSTOLIC BLOOD PRESSURE: 123 MMHG | DIASTOLIC BLOOD PRESSURE: 74 MMHG | OXYGEN SATURATION: 97 % | HEART RATE: 58 BPM | BODY MASS INDEX: 40.43 KG/M2

## 2024-08-22 DIAGNOSIS — E11.9 TYPE 2 DIABETES MELLITUS WITHOUT COMPLICATION, WITHOUT LONG-TERM CURRENT USE OF INSULIN (MULTI): Primary | ICD-10-CM

## 2024-08-22 DIAGNOSIS — Z98.84 STATUS POST GASTRIC BYPASS FOR OBESITY: ICD-10-CM

## 2024-08-22 PROCEDURE — 3008F BODY MASS INDEX DOCD: CPT | Performed by: SURGERY

## 2024-08-22 PROCEDURE — 3078F DIAST BP <80 MM HG: CPT | Performed by: SURGERY

## 2024-08-22 PROCEDURE — 3044F HG A1C LEVEL LT 7.0%: CPT | Performed by: SURGERY

## 2024-08-22 PROCEDURE — 3074F SYST BP LT 130 MM HG: CPT | Performed by: SURGERY

## 2024-08-22 PROCEDURE — 99211 OFF/OP EST MAY X REQ PHY/QHP: CPT | Performed by: SURGERY

## 2024-08-22 PROCEDURE — 4010F ACE/ARB THERAPY RXD/TAKEN: CPT | Performed by: SURGERY

## 2024-08-22 PROCEDURE — 1036F TOBACCO NON-USER: CPT | Performed by: SURGERY

## 2024-08-22 RX ORDER — CALCIUM CITRATE/VITAMIN D3 500MG-12.5
1 TABLET,CHEWABLE ORAL 3 TIMES DAILY
COMMUNITY

## 2024-08-22 RX ORDER — UBIDECARENONE 75 MG
500 CAPSULE ORAL DAILY
COMMUNITY

## 2024-08-22 RX ORDER — FERROUS SULFATE 325(65) MG
325 TABLET ORAL
COMMUNITY

## 2024-08-22 ASSESSMENT — PAIN SCALES - GENERAL: PAINLEVEL: 0-NO PAIN

## 2024-09-04 ENCOUNTER — TELEPHONE (OUTPATIENT)
Dept: SURGERY | Facility: CLINIC | Age: 44
End: 2024-09-04
Payer: COMMERCIAL

## 2024-09-04 NOTE — TELEPHONE ENCOUNTER
Called pt today review soft diet  He is tolerating the pureed diet well.   He is meeting his fluid and protein goals.  He drinks 3 bottles of Propel and 3 c water per day.   He drinks 3 protein shakes pre day.     Pt reports that his urine is dark but he is meeting his fluid goal.  Advised to increase fluid intake. Pt also states he feels run down.  Suggested drinking 1-2 protein shakes per day in addition to the protein foods he will eat on the soft food phase.     Pt will start soft food today.   Advised to read over the soft food diet in the NG booklet.     Karis Kirk RD, LD

## 2024-09-15 PROBLEM — K91.2 POSTOPERATIVE MALABSORPTION (HHS-HCC): Status: ACTIVE | Noted: 2024-09-15

## 2024-09-15 NOTE — PROGRESS NOTES
BARIATRIC SURGERY CLINIC  Comprehensive Weight Management        Patient: Ramirez Rodriguez   MRN: [id number]     Index Surgery:  Date: 08/12/2024  Surgeon: Dr. Wheeler  Procedure: RYGB    Virtual or Telephone Consent: An interactive audio and video telecommunication system which permits real time communications between the patient (at the originating site) and provider (at the distant site) was utilized to provide this telehealth service. Verbal consent was requested and obtained from Ramirez Rodriguez on this date, 09/16/24, for a telehealth (audio and video) virtual visit.     HPI   Ramirez Rodriguez is a 43 y.o. male presenting for 6 week pov s/p RYGB.     Diet:  - Stage: soft food diet, advancing to regular  - Achieving protein and fluid goals most days: yes     Exercise:  - walking, increasing as tolerated    Concerns related to:  Nausea/Vomiting, Reflux: denies  Abdominal Pain: denies  Diarrhea/Constipation- bowel function is regular    Daily Supplements:  Calcium: Calcium Citrate w/ vitamin D (1200 - 1500mg)  Multivitamin & Minerals: 2 per day  Vitamin B12: 500 mcg/day   Vitamin D3: 3000 units   Iron/Other: iron supplement   PPI: taking    Primary Medical Hx:  Patient Active Problem List   Diagnosis    Adjustment disorder, unspecified    Hypertension    LYNDSEY treated with BiPAP    Type 2 diabetes mellitus without complication, without long-term current use of insulin (Multi)    Shortness of breath on exertion    Hip pain, right    Type 2 diabetes mellitus with hyperglycemia (Multi)    Hyperlipidemia, mild    Abnormal EKG    Pre-op evaluation    Obesity, morbid, BMI 50 or higher (Multi)    Bariatric surgery status    Morbid (severe) obesity due to excess calories (Multi)    Status post gastric bypass for obesity    Postoperative malabsorption (HHS-HCC)      Past Surgical History:   Procedure Laterality Date    APPENDECTOMY  12/04/2023    BARIATRIC SURGERY  08/12/2024    ROBOT ASSISTED LAPAROSCOPIC GASTRIC BYPASS/EGD/TAP  BLOCK 52903 - [place] LAPS GSTR RSTCV PX W/BYP DONNA-EN-Y LIMB <150 CM (Dr. Tiny Wheeler @ [facility])    SHOULDER Right 2012      Social History     Socioeconomic History    Marital status:      Spouse name: Not on file    Number of children: Not on file    Years of education: Not on file    Highest education level: Not on file   Occupational History    Not on file   Tobacco Use    Smoking status: Never    Smokeless tobacco: Never   Vaping Use    Vaping status: Never Used   Substance and Sexual Activity    Alcohol use: Not Currently     Comment: Socially    Drug use: Never    Sexual activity: Not Currently   Other Topics Concern    Not on file   Social History Narrative    Not on file     Social Determinants of Health     Financial Resource Strain: Low Risk  (8/12/2024)    Overall Financial Resource Strain (CARDIA)     Difficulty of Paying Living Expenses: Not very hard   Food Insecurity: Not on file   Transportation Needs: No Transportation Needs (8/12/2024)    PRAPARE - Transportation     Lack of Transportation (Medical): No     Lack of Transportation (Non-Medical): No   Physical Activity: Not on file   Stress: Not on file   Social Connections: Not on file   Intimate Partner Violence: Not on file   Housing Stability: Low Risk  (8/12/2024)    Housing Stability Vital Sign     Unable to Pay for Housing in the Last Year: No     Number of Times Moved in the Last Year: 1     Homeless in the Last Year: No     Family History   Problem Relation Name Age of Onset    Heart disease Mother Haritha     Stroke Mother Haritha     Hypertension Mother Haritha     Alcohol abuse Other      Stroke Other      Diabetes Other Grandfather         Does not specify which one    Diabetes Paternal Grandfather Ramirez       Current Outpatient Medications on File Prior to Visit   Medication Sig Dispense Refill    albuterol 90 mcg/actuation inhaler INHALE 2 PUFFS BY MOUTH PRIOR TO EXERTION/ EXERCISE AND EVERY 4 TO 6 HOURS AS NEEDED FOR COUGH OR SHORTNESS OF  "BREATH      calcium citrate-vitamin D3 500 mg-12.5 mcg (500 unit) tablet,chewable Chew 1 tablet 3 times a day.      cyanocobalamin (Vitamin B-12) 500 mcg tablet Take 1 tablet (500 mcg) by mouth once daily.      dulaglutide (Trulicity) 3 mg/0.5 mL pen injector Inject 3 mg under the skin 1 (one) time per week. (Patient not taking: Reported on 2024) 2 mL 1    DULoxetine (Cymbalta) 60 mg DR capsule Take 1 capsule (60 mg) by mouth once daily. 90 capsule 1    [] enoxaparin (Lovenox) 60 mg/0.6 mL syringe Inject 0.6 mL (60 mg) under the skin once daily for 28 days. 28 each 0    ferrous sulfate, 325 mg ferrous sulfate, tablet Take 1 tablet by mouth once daily with breakfast. Celebrate vitamins; chewable 30mg Iron + vitamin C      glipiZIDE (Glucotrol) 5 mg tablet Take 1 tablet (5 mg) by mouth 2 times a day before meals. (Patient not taking: Reported on 2024) 60 tablet 5    lisinopril 20 mg tablet TAKE 1 TABLET BY MOUTH ONCE DAILY (Patient not taking: Reported on 2024) 90 tablet 0    metoprolol ta-hydrochlorothiaz (Lopressor HCT) 100-25 mg tablet TAKE 1 TABLET BY MOUTH ONCE DAILY 90 tablet 1    multivit-min/iron/folic acid/K (BARIATRIC MULTIVITAMINS ORAL) Take 1 tablet by mouth 1 time. Bariatric advantage chewable      omeprazole (PriLOSEC) 40 mg DR capsule Take 1 capsule (40 mg) by mouth once daily in the morning. Take before meals. Do not crush or chew. Open capsule, sprinkle beads on Sugar Free jello, pudding or applesauce. 90 capsule 1    ondansetron ODT (Zofran-ODT) 4 mg disintegrating tablet Dissolve 1 tablet (4 mg) by mouth every 6 hours if needed for nausea or vomiting. 60 tablet 1     No current facility-administered medications on file prior to visit.      Allergies   Allergen Reactions    Penicillins Anaphylaxis     STATES THROAT CLOSES      REVIEW OF SYSTEMS:  Negative unless otherwise reviewed in HPI.    PHYSICAL EXAM   Physical Exam   Ht: 6'2\"  Wt: 316 lbs BMI: Body mass index is 40.57 " kg/m².   Alert, well appearing, no acute distress, nourished, hydrated.  Anicteric sclera, no ptosis  Facial symmetry  Neck supple  Unlabored respirations.  Easily conversant without increased respiratory effort  Oriented to person, place, time.  Judgement intact.  Appropriate mood, affect.     ASSESSMENT & PLAN  43 y.o. male presenting for 6 week pov s/p RYGB.     Weight Impression:  -Initial Weight: 376 lbs   -Today's Weight: 316 lbs   -%Total Weight Loss: -16%     Problem List Items Addressed This Visit       Hypertension     Goal BP <120/80  Continue follow up with PCP for mgmt of antiHTN regimen  Encourage adherence to medications if prescribed for BP control  DASH/Mediterranean Diet lifestyle encouraged.  Weight reduction of 5-10% of clinical significance to improve BP control  Continues to work on lifestyle change goals to support blood pressure control and weight reduction.  Continue to follow up with your primary care physician         Relevant Orders    CBC and Auto Differential    Comprehensive Metabolic Panel    Type 2 diabetes mellitus without complication, without long-term current use of insulin (Multi)     Recommend controlled CHO Diet to improve glycemic control.    Carbohydrate portions at meals <40g per meal depending on activity level reviewed.  Avoid sugar sweetened beverages  Engage in regular aerobic exercise at least 150 minutes per week for CV benefit.  Encourage self monitoring of blood glucose values  Optimal values of blood glucose:  Fasting < 90  PreMeal < 100  30 minutes post meal < 140  60 minutes post meal < 120  90 minutes post meal < 100    Discussed roles of combining dietary protein, increased dietary fiber and reduction of simple carbohydrates to benefit glycemic control.   Discussed importance of regular exercise for glycemic control.   Ongoing follow up with PCP, Endo and speciality providers for retinopathy & nephropathy screening encouraged annually.          Relevant Orders     CBC and Auto Differential    Comprehensive Metabolic Panel    Hyperlipidemia, mild     Limiting dietary saturated fat encouraged <5-10% total kcal.  Increase dietary fiber to a minimum goal 25-30g per day.  Emphasis on whole foods based dietary carbohydrates.  Controlled carbohydrate intake reviewed for triglyceride reduction.    May consider high quality fish oil 2g EPA/DHA per day for CV benefit.  Regular exercise encouraged for CV risk reduction at least 150 min/week of moderate intense aerobic exercise encouraged.   Regular follow up with PCP/cardiology for lipid monitoring, CV risk reduction and appropriate lipid lowering therapies.         Relevant Orders    Comprehensive Metabolic Panel    Status post gastric bypass for obesity     Patient is doing well   six weeks s/p RYGB   no acute issues   takes supplements   does regular exercise  no pain or GERD   on PPI    Recs:     doing well  No acute issues   advised to continue Diet,   increase exercise,  FU with dietitian   continue vitamin supplementation, PPI   support groups  FU 6 weeks         Relevant Orders    CBC and Auto Differential    Comprehensive Metabolic Panel    Postoperative malabsorption (HHS-HCC) - Primary     Check micronutrient levels - see orders  Adjust micronutrient supplementation per results  Continue recommended post bariatric surgery supplements         Relevant Orders    Ferritin    Folate    Iron and TIBC    Vitamin B12    Vitamin D 25-Hydroxy,Total (for eval of Vitamin D levels)   Please see Patient Instructions for today's relevant referrals, orders and instructions.      Follow Up: Follow up for as scheduled for 3 mo pov.     Dyan Kc, APRN-CNP

## 2024-09-16 ENCOUNTER — OFFICE VISIT (OUTPATIENT)
Dept: SURGERY | Facility: CLINIC | Age: 44
End: 2024-09-16
Payer: COMMERCIAL

## 2024-09-16 ENCOUNTER — NUTRITION (OUTPATIENT)
Dept: SURGERY | Facility: CLINIC | Age: 44
End: 2024-09-16
Payer: COMMERCIAL

## 2024-09-16 VITALS — BODY MASS INDEX: 40.43 KG/M2 | HEIGHT: 74 IN | WEIGHT: 315 LBS

## 2024-09-16 VITALS — HEIGHT: 74 IN | WEIGHT: 315 LBS | BODY MASS INDEX: 40.43 KG/M2

## 2024-09-16 DIAGNOSIS — K91.2 POSTOPERATIVE MALABSORPTION (HHS-HCC): Primary | ICD-10-CM

## 2024-09-16 DIAGNOSIS — E78.5 HYPERLIPIDEMIA, MILD: ICD-10-CM

## 2024-09-16 DIAGNOSIS — E11.9 TYPE 2 DIABETES MELLITUS WITHOUT COMPLICATION, WITHOUT LONG-TERM CURRENT USE OF INSULIN (MULTI): ICD-10-CM

## 2024-09-16 DIAGNOSIS — I10 HYPERTENSION, UNSPECIFIED TYPE: ICD-10-CM

## 2024-09-16 DIAGNOSIS — Z98.84 STATUS POST GASTRIC BYPASS FOR OBESITY: ICD-10-CM

## 2024-09-16 DIAGNOSIS — G47.33 OSA TREATED WITH BIPAP: ICD-10-CM

## 2024-09-16 PROCEDURE — 99211 OFF/OP EST MAY X REQ PHY/QHP: CPT

## 2024-09-16 PROCEDURE — 3008F BODY MASS INDEX DOCD: CPT

## 2024-09-16 PROCEDURE — 3044F HG A1C LEVEL LT 7.0%: CPT

## 2024-09-16 PROCEDURE — 4010F ACE/ARB THERAPY RXD/TAKEN: CPT

## 2024-09-16 NOTE — ASSESSMENT & PLAN NOTE
Patient is doing well   six weeks s/p RYGB   no acute issues   takes supplements   does regular exercise  no pain or GERD   on PPI    Recs:     doing well  No acute issues   advised to continue Diet,   increase exercise,  FU with dietitian   continue vitamin supplementation, PPI   support groups  FU 6 weeks

## 2024-09-17 NOTE — ASSESSMENT & PLAN NOTE
- continue to follow with sleep medicine  - continue to wear CPAP / BiPAP as ordered with prescribed settings  - reviewed practices to promote adequate sleep hygiene

## 2024-09-24 ENCOUNTER — OFFICE VISIT (OUTPATIENT)
Dept: URGENT CARE | Age: 44
End: 2024-09-24
Payer: COMMERCIAL

## 2024-09-24 VITALS
WEIGHT: 315 LBS | RESPIRATION RATE: 16 BRPM | BODY MASS INDEX: 40.57 KG/M2 | TEMPERATURE: 96.2 F | HEART RATE: 52 BPM | SYSTOLIC BLOOD PRESSURE: 147 MMHG | DIASTOLIC BLOOD PRESSURE: 89 MMHG | OXYGEN SATURATION: 96 %

## 2024-09-24 DIAGNOSIS — R10.31 RIGHT LOWER QUADRANT ABDOMINAL PAIN: Primary | ICD-10-CM

## 2024-09-24 ASSESSMENT — ENCOUNTER SYMPTOMS
SHORTNESS OF BREATH: 0
ABDOMINAL PAIN: 1
CHILLS: 0
VOMITING: 0
FEVER: 0
WHEEZING: 0
FREQUENCY: 0
COUGH: 0
FLANK PAIN: 0
NAUSEA: 0
CHEST TIGHTNESS: 0
CONSTIPATION: 0
DIARRHEA: 0
DYSURIA: 0

## 2024-09-24 NOTE — PROGRESS NOTES
Subjective   Patient ID: Ramirez Rodriguez is a 43 y.o. male. They present today with a chief complaint of Abdominal Pain.    History of Present Illness  Patient reports that he started with scrotal pain that subsequently moved to the lumbar area and finally settled in the RLQ of the abdomen.  Patient states that his symptoms resolved in the waiting area.  Prior to resolution, patient reports that he had 8-9/10 sharp pain that was quickly worsening.  Patient states that he had some relief with Tylenol.  Patient had abdominal surgery 5 weeks ago.      Abdominal Pain  Pertinent negatives include no constipation, diarrhea, dysuria, fever, frequency, nausea or vomiting.       Past Medical History  Allergies as of 09/24/2024 - Reviewed 09/24/2024   Allergen Reaction Noted    Penicillins Anaphylaxis 03/21/2023       (Not in a hospital admission)       Past Medical History:   Diagnosis Date    Diabetes mellitus (Multi)     HTN (hypertension)     Hyperlipidemia     Morbid obesity (Multi)     Morbid obesity (Multi)     Pulmonary arterial hypertension (Multi)     Severe obstructive sleep apnea        Past Surgical History:   Procedure Laterality Date    APPENDECTOMY  12/04/2023    BARIATRIC SURGERY  08/12/2024    ROBOT ASSISTED LAPAROSCOPIC GASTRIC BYPASS/EGD/TAP BLOCK 24777 - [place] LAPS GSTR RSTCV PX W/BYP DONNA-EN-Y LIMB <150 CM (Dr. Tiny Wheeler @ [facility])    SHOULDER Right 2012        reports that he has never smoked. He has never used smokeless tobacco. He reports that he does not currently use alcohol. He reports that he does not use drugs.    Review of Systems  Review of Systems   Constitutional:  Negative for chills and fever.   Respiratory:  Negative for cough, chest tightness, shortness of breath and wheezing.    Cardiovascular:  Negative for chest pain.   Gastrointestinal:  Positive for abdominal pain. Negative for constipation, diarrhea, nausea and vomiting.   Genitourinary:  Negative for dysuria, flank pain, frequency  and urgency.                                  Objective    Vitals:    09/24/24 1552   BP: 147/89   Pulse: 52   Resp: 16   Temp: 35.7 °C (96.2 °F)   SpO2: 96%   Weight: 143 kg (316 lb)     No LMP for male patient.    Physical Exam  Vitals reviewed.   Constitutional:       General: He is not in acute distress.     Appearance: He is obese. He is not ill-appearing.   Cardiovascular:      Rate and Rhythm: Normal rate and regular rhythm.      Heart sounds: No murmur heard.     No friction rub.   Pulmonary:      Effort: No respiratory distress.      Breath sounds: No stridor.   Abdominal:      General: Abdomen is flat. Bowel sounds are normal.      Palpations: Abdomen is soft. There is no mass.      Tenderness: There is no abdominal tenderness. There is no right CVA tenderness or left CVA tenderness.   Neurological:      Mental Status: He is alert.         Procedures    Point of Care Test & Imaging Results from this visit  No results found for this visit on 09/24/24.   No results found.    Diagnostic study results (if any) were reviewed by Sreedhar Hays DO.    Assessment/Plan   Allergies, medications, history, and pertinent labs/EKGs/Imaging reviewed by Sreedhar Hays DO.     Orders and Diagnoses  There are no diagnoses linked to this encounter.    Medical Admin Record      Patient disposition: Home    Electronically signed by Sreedhar Hays DO  4:42 PM

## 2024-10-07 ENCOUNTER — OFFICE VISIT (OUTPATIENT)
Dept: URGENT CARE | Age: 44
End: 2024-10-07
Payer: COMMERCIAL

## 2024-10-07 VITALS
HEART RATE: 69 BPM | OXYGEN SATURATION: 99 % | DIASTOLIC BLOOD PRESSURE: 96 MMHG | RESPIRATION RATE: 16 BRPM | SYSTOLIC BLOOD PRESSURE: 166 MMHG | WEIGHT: 306 LBS | BODY MASS INDEX: 39.29 KG/M2

## 2024-10-07 DIAGNOSIS — N30.91 HEMORRHAGIC CYSTITIS: Primary | ICD-10-CM

## 2024-10-07 DIAGNOSIS — R35.0 FREQUENCY OF MICTURITION: ICD-10-CM

## 2024-10-07 PROBLEM — R30.0 DYSURIA: Chronic | Status: ACTIVE | Noted: 2024-10-07

## 2024-10-07 LAB
POC APPEARANCE, URINE: ABNORMAL
POC BILIRUBIN, URINE: ABNORMAL
POC BLOOD, URINE: ABNORMAL
POC COLOR, URINE: ABNORMAL
POC GLUCOSE, URINE: NEGATIVE MG/DL
POC KETONES, URINE: NEGATIVE MG/DL
POC LEUKOCYTES, URINE: ABNORMAL
POC NITRITE,URINE: NEGATIVE
POC PH, URINE: 6 PH
POC PROTEIN, URINE: ABNORMAL MG/DL
POC SPECIFIC GRAVITY, URINE: 1.02
POC UROBILINOGEN, URINE: 0.2 EU/DL

## 2024-10-07 PROCEDURE — 87086 URINE CULTURE/COLONY COUNT: CPT

## 2024-10-07 RX ORDER — SULFAMETHOXAZOLE AND TRIMETHOPRIM 800; 160 MG/1; MG/1
1 TABLET ORAL 2 TIMES DAILY
Qty: 14 TABLET | Refills: 0 | Status: SHIPPED | OUTPATIENT
Start: 2024-10-07 | End: 2024-10-14

## 2024-10-07 NOTE — PROGRESS NOTES
Subjective   Patient ID: Ramirez Rodriguez is a 43 y.o. male. They present today with a chief complaint of Trouble urinating (Trouble urinating for two days, pain 5/10, constantly have the urge).    History of Present Illness  HPI  3 Days of frequent uncomfortable urination.  Urine has become dark for the past 24 hours. No abdominal pains. No back pain. No vaginal discharge or rash. No nausea, vomiting or diarrhea. No known exposures to STDs. Denies fevers or chills. No medications taken yet for symptoms.      Past Medical History  Allergies as of 10/07/2024 - Reviewed 10/07/2024   Allergen Reaction Noted    Penicillins Anaphylaxis 03/21/2023       (Not in a hospital admission)       Past Medical History:   Diagnosis Date    Diabetes mellitus (Multi)     HTN (hypertension)     Hyperlipidemia     Morbid obesity (Multi)     Morbid obesity (Multi)     Pulmonary arterial hypertension (Multi)     Severe obstructive sleep apnea        Past Surgical History:   Procedure Laterality Date    APPENDECTOMY  12/04/2023    BARIATRIC SURGERY  08/12/2024    ROBOT ASSISTED LAPAROSCOPIC GASTRIC BYPASS/EGD/TAP BLOCK 49487 - [place] LAPS GSTR RSTCV PX W/BYP DONNA-EN-Y LIMB <150 CM (Dr. Tiny Wheeler @ [facility])    SHOULDER Right 2012        reports that he has never smoked. He has never used smokeless tobacco. He reports that he does not currently use alcohol. He reports that he does not use drugs.    Review of Systems  Review of Systems as in history of present illness                               Objective    Vitals:    10/07/24 1839   BP: (!) 166/96   Pulse: 69   Resp: 16   SpO2: 99%   Weight: 139 kg (306 lb)     No LMP for male patient.    Physical Exam  Alert and oriented, no apparent distress  Abdomen soft, nontender, nondistended.  No CVA tenderness  Skin shows no rashes sores or lesions   exam not indicated at this time  Procedures    Point of Care Test & Imaging Results from this visit  Results for orders placed or performed in visit  on 10/07/24   POCT UA Automated manually resulted   Result Value Ref Range    POC Color, Urine Red-brown (A) Straw, Yellow, Light-Yellow    POC Appearance, Urine Turbid (A) Clear    POC Glucose, Urine NEGATIVE NEGATIVE mg/dl    POC Bilirubin, Urine SMALL (1+) (A) NEGATIVE    POC Ketones, Urine NEGATIVE NEGATIVE mg/dl    POC Specific Gravity, Urine 1.025 1.005 - 1.035    POC Blood, Urine LARGE (3+) (A) NEGATIVE    POC PH, Urine 6.0 No Reference Range Established PH    POC Protein, Urine 30 (1+) NEGATIVE, 30 (1+) mg/dl    POC Urobilinogen, Urine 0.2 0.2, 1.0 EU/DL    Poc Nitrite, Urine NEGATIVE NEGATIVE    POC Leukocytes, Urine LARGE (3+) (A) NEGATIVE      No results found.    Diagnostic study results (if any) were reviewed by Gómez Beauchamp MD.    Assessment/Plan   Allergies, medications, history, and pertinent labs/EKGs/Imaging reviewed by Gómez Beauchamp MD.     Medical Decision Making  At time of discharge patient was clinically well-appearing and HDS for outpatient management. The patient and/or family was educated regarding diagnosis, supportive care, OTC and Rx medications. The patient and/or family was given the opportunity to ask questions prior to discharge.  They verbalized understanding of my discussion of the plans for treatment, expected course, indications to return to  or seek further evaluation in ED, and the need for timely follow up as directed.   They were provided with a work/school excuse if requested.    Orders and Diagnoses  Diagnoses and all orders for this visit:  Hemorrhagic cystitis  -     sulfamethoxazole-trimethoprim (Bactrim DS) 800-160 mg tablet; Take 1 tablet by mouth 2 times a day for 7 days.  Frequency of micturition  -     POCT UA Automated manually resulted      Medical Admin Record      Patient disposition: Home    Electronically signed by Gómez Beauchamp MD  6:54 PM

## 2024-10-07 NOTE — PROGRESS NOTES
Subjective        Ramirez Rodriguez is a 43 y.o. male who presents for      HPI:    Dr Tarango pt        No chief complaint on file.        What concern/ problem/pain/symptom  brings you here today?      how long has pt had sxs?      describe symptoms-  Fever-  Nausea-  Vomiting-  Blood in urine-  Pain-       how often do symptoms occur?      has pt tried anything for current symptoms, including medications (OTC or prescription)  ?        what makes symptoms worse?      has pt been seen recently for this problem ( within past 2-3 weeks) ?    if yes- where?    by who?    what treatment was provided?              Social History     Tobacco Use   Smoking Status Never   Smokeless Tobacco Never         Social History     Substance and Sexual Activity   Alcohol Use Not Currently    Comment: Socially          Review of Systems:    Review of Systems    Objective        There were no vitals filed for this visit.        Pt is A and O x3, NAD, nontoxic, well-hydrated   Head- normocephalic and atraumatic,   EYES- conjunctiva- normal   lids- normal  EARS/NOSE- normal external exam   CV- RRR without murmur  PULM- CTA bilaterally, normal respiratory effort  RESPIRATORY EFFORT- normal , no retractions or nasal flaring   ABD- normoactive BS's , soft, no HSM, no CVAT, NT   EXT- no edema,NT  SKIN- no abnormal skin lesions noted  NEURO- no focal deficits  PSYCH- pleasant, normal judgement and insight                BP Readings from Last 3 Encounters:   09/24/24 147/89   08/22/24 123/74   08/14/24 156/90           Wt Readings from Last 3 Encounters:   09/24/24 143 kg (316 lb)   09/16/24 143 kg (316 lb)   09/16/24 143 kg (316 lb)         BMI Readings from Last 3 Encounters:   09/24/24 40.57 kg/m²   09/16/24 40.57 kg/m²   09/16/24 40.57 kg/m²           Assessment & Plan  Dysuria                              Call if no better or if symptoms worsen

## 2024-10-08 ENCOUNTER — APPOINTMENT (OUTPATIENT)
Dept: PRIMARY CARE | Facility: CLINIC | Age: 44
End: 2024-10-08
Payer: COMMERCIAL

## 2024-10-08 DIAGNOSIS — R30.0 DYSURIA: Primary | Chronic | ICD-10-CM

## 2024-10-08 LAB — BACTERIA UR CULT: NO GROWTH

## 2024-10-10 PROCEDURE — RXMED WILLOW AMBULATORY MEDICATION CHARGE

## 2024-10-14 ENCOUNTER — PHARMACY VISIT (OUTPATIENT)
Dept: PHARMACY | Facility: CLINIC | Age: 44
End: 2024-10-14
Payer: COMMERCIAL

## 2024-10-21 ENCOUNTER — OFFICE VISIT (OUTPATIENT)
Dept: URGENT CARE | Age: 44
End: 2024-10-21
Payer: COMMERCIAL

## 2024-10-21 VITALS
RESPIRATION RATE: 16 BRPM | SYSTOLIC BLOOD PRESSURE: 175 MMHG | WEIGHT: 300 LBS | DIASTOLIC BLOOD PRESSURE: 99 MMHG | OXYGEN SATURATION: 99 % | BODY MASS INDEX: 38.52 KG/M2 | TEMPERATURE: 97.3 F | HEART RATE: 79 BPM

## 2024-10-21 DIAGNOSIS — R31.9 HEMATURIA, UNSPECIFIED TYPE: ICD-10-CM

## 2024-10-21 LAB
POC APPEARANCE, URINE: ABNORMAL
POC BILIRUBIN, URINE: ABNORMAL
POC BLOOD, URINE: ABNORMAL
POC COLOR, URINE: ABNORMAL
POC GLUCOSE, URINE: NEGATIVE MG/DL
POC KETONES, URINE: ABNORMAL MG/DL
POC LEUKOCYTES, URINE: NEGATIVE
POC NITRITE,URINE: NEGATIVE
POC PH, URINE: 6 PH
POC PROTEIN, URINE: ABNORMAL MG/DL
POC SPECIFIC GRAVITY, URINE: >=1.03
POC UROBILINOGEN, URINE: 1 EU/DL

## 2024-10-21 PROCEDURE — 1036F TOBACCO NON-USER: CPT | Performed by: STUDENT IN AN ORGANIZED HEALTH CARE EDUCATION/TRAINING PROGRAM

## 2024-10-21 PROCEDURE — 3044F HG A1C LEVEL LT 7.0%: CPT | Performed by: STUDENT IN AN ORGANIZED HEALTH CARE EDUCATION/TRAINING PROGRAM

## 2024-10-21 PROCEDURE — 3077F SYST BP >= 140 MM HG: CPT | Performed by: STUDENT IN AN ORGANIZED HEALTH CARE EDUCATION/TRAINING PROGRAM

## 2024-10-21 PROCEDURE — 4010F ACE/ARB THERAPY RXD/TAKEN: CPT | Performed by: STUDENT IN AN ORGANIZED HEALTH CARE EDUCATION/TRAINING PROGRAM

## 2024-10-21 PROCEDURE — 3080F DIAST BP >= 90 MM HG: CPT | Performed by: STUDENT IN AN ORGANIZED HEALTH CARE EDUCATION/TRAINING PROGRAM

## 2024-10-21 PROCEDURE — 81003 URINALYSIS AUTO W/O SCOPE: CPT | Performed by: FAMILY MEDICINE

## 2024-10-21 PROCEDURE — 99214 OFFICE O/P EST MOD 30 MIN: CPT | Performed by: STUDENT IN AN ORGANIZED HEALTH CARE EDUCATION/TRAINING PROGRAM

## 2024-10-21 NOTE — PROGRESS NOTES
Subjective   Patient ID: Ramirez Rodriguez is a 43 y.o. male. They present today with a chief complaint of Blood clots in urine (Blood clots in urine).    History of Present Illness  Patient reports ~10 days of hematuria and dysuria  Reports pain is 4-5/10 while sitting but becomes a 10/10 while urinating  Reports that it started a dark tea colored like urine but over the last ~1 day there have been blood clots  Reports that AZO seemed to help take the edge of a little bit but not significantly and was only able to take it for a couple days as per package instructions  Reports that while at the urgent care ~10 days ago he received bactrim which didn't seem to improve his symptoms  Reports hx of gastric bypass  Drinks 60oz + of water per day   Denies hx of prostate issues  Denies hx of kidney stones  Denies concern for STI  Denies flank pain  Denies suprapubic pain  Denies smoking hx  Denies working with paints or dyes      Past Medical History  Allergies as of 10/21/2024 - Reviewed 10/21/2024   Allergen Reaction Noted    Penicillins Anaphylaxis 03/21/2023       (Not in a hospital admission)       Past Medical History:   Diagnosis Date    Diabetes mellitus (Multi)     HTN (hypertension)     Hyperlipidemia     Morbid obesity (Multi)     Morbid obesity (Multi)     Pulmonary arterial hypertension (Multi)     Severe obstructive sleep apnea        Past Surgical History:   Procedure Laterality Date    APPENDECTOMY  12/04/2023    BARIATRIC SURGERY  08/12/2024    ROBOT ASSISTED LAPAROSCOPIC GASTRIC BYPASS/EGD/TAP BLOCK 05180 - DC LAPS GSTR RSTCV PX W/BYP DONNA-EN-Y LIMB <150 CM (Dr. Tiny Wheeler @ [facility])    SHOULDER Right 2012        reports that he has never smoked. He has never used smokeless tobacco. He reports that he does not currently use alcohol. He reports that he does not use drugs.                               Objective    Vitals:    10/21/24 1624   BP: (!) 175/99   Pulse: 79   Resp: 16   Temp: 36.3 °C (97.3 °F)    SpO2: 99%   Weight: 136 kg (300 lb)     No LMP for male patient.    Physical Exam  Constitutional:       General: He is in acute distress.      Appearance: He is ill-appearing. He is not toxic-appearing or diaphoretic.   Eyes:      General: No scleral icterus.        Right eye: No discharge.         Left eye: No discharge.      Extraocular Movements: Extraocular movements intact.   Pulmonary:      Effort: Pulmonary effort is normal.   Abdominal:      Tenderness: There is no right CVA tenderness, left CVA tenderness or guarding.   Neurological:      Mental Status: He is alert.   Psychiatric:         Mood and Affect: Mood normal.         Behavior: Behavior normal.         Thought Content: Thought content normal.         Procedures    Point of Care Test & Imaging Results from this visit:  Results for orders placed or performed in visit on 10/21/24   POCT UA Automated manually resulted    Collection Time: 10/21/24  4:31 PM   Result Value Ref Range    POC Color, Urine Red-brown (A) Straw, Yellow, Light-Yellow    POC Appearance, Urine Turbid (A) Clear    POC Glucose, Urine NEGATIVE NEGATIVE mg/dl    POC Bilirubin, Urine SMALL (1+) (A) NEGATIVE    POC Ketones, Urine 40 (2+) (A) NEGATIVE mg/dl    POC Specific Gravity, Urine >=1.030 1.005 - 1.035    POC Blood, Urine LARGE (3+) (A) NEGATIVE    POC PH, Urine 6.0 No Reference Range Established PH    POC Protein, Urine >=300 (3+) (A) NEGATIVE, 30 (1+) mg/dl    POC Urobilinogen, Urine 1.0 0.2, 1.0 EU/DL    Poc Nitrite, Urine NEGATIVE NEGATIVE    POC Leukocytes, Urine NEGATIVE NEGATIVE       Diagnostic study results (if any) were reviewed by Willy Mercedes MD.    Assessment/Plan   Allergies, medications, history, and pertinent labs/EKGs/Imaging reviewed by Willy Mercedes MD.     Medical Decision Making:    Patient's hematuria with now blood like clots in the urine is likely 2/2 kidney stones. UA positive for for blood large 3+. Hx of gastric bypass would potentially  increase his risk of calcium oxalate stones. AF HDS. Given his 10/10 pain and now clots, through shared decision making, patient will proceed to the ER for further management.     Orders and Diagnoses  Diagnoses and all orders for this visit:  Hematuria, unspecified type  -     POCT UA Automated manually resulted      Patient disposition: ED      Medical Admin Record      Follow Up Instructions  No follow-ups on file.    Electronically signed by Willy Mercedes MD  4:47 PM

## 2024-10-24 ENCOUNTER — APPOINTMENT (OUTPATIENT)
Dept: PRIMARY CARE | Facility: CLINIC | Age: 44
End: 2024-10-24
Payer: COMMERCIAL

## 2024-10-24 VITALS
WEIGHT: 302.8 LBS | TEMPERATURE: 97.9 F | HEIGHT: 74 IN | OXYGEN SATURATION: 96 % | BODY MASS INDEX: 38.86 KG/M2 | RESPIRATION RATE: 12 BRPM | DIASTOLIC BLOOD PRESSURE: 83 MMHG | HEART RATE: 68 BPM | SYSTOLIC BLOOD PRESSURE: 127 MMHG

## 2024-10-24 DIAGNOSIS — E11.9 TYPE 2 DIABETES MELLITUS WITHOUT COMPLICATION, WITHOUT LONG-TERM CURRENT USE OF INSULIN (MULTI): ICD-10-CM

## 2024-10-24 DIAGNOSIS — F43.29 ADJUSTMENT DISORDER WITH OTHER SYMPTOM: ICD-10-CM

## 2024-10-24 DIAGNOSIS — B07.0 PLANTAR WART OF RIGHT FOOT: ICD-10-CM

## 2024-10-24 DIAGNOSIS — Z00.00 PHYSICAL EXAM, ANNUAL: Primary | ICD-10-CM

## 2024-10-24 PROBLEM — R30.0 DYSURIA: Chronic | Status: RESOLVED | Noted: 2024-10-07 | Resolved: 2024-10-24

## 2024-10-24 PROBLEM — I10 HYPERTENSION: Status: RESOLVED | Noted: 2023-03-21 | Resolved: 2024-10-24

## 2024-10-24 PROBLEM — F43.20 ADJUSTMENT DISORDER, UNSPECIFIED: Status: RESOLVED | Noted: 2023-03-21 | Resolved: 2024-10-24

## 2024-10-24 PROBLEM — E66.01 OBESITY, MORBID, BMI 50 OR HIGHER (MULTI): Status: RESOLVED | Noted: 2024-07-22 | Resolved: 2024-10-24

## 2024-10-24 PROBLEM — Z01.818 PRE-OP EVALUATION: Status: RESOLVED | Noted: 2023-11-28 | Resolved: 2024-10-24

## 2024-10-24 PROBLEM — G47.33 OSA TREATED WITH BIPAP: Status: RESOLVED | Noted: 2023-03-21 | Resolved: 2024-10-24

## 2024-10-24 PROBLEM — R06.02 SHORTNESS OF BREATH ON EXERTION: Status: RESOLVED | Noted: 2023-05-16 | Resolved: 2024-10-24

## 2024-10-24 PROBLEM — M25.551 HIP PAIN, RIGHT: Status: RESOLVED | Noted: 2023-05-16 | Resolved: 2024-10-24

## 2024-10-24 PROBLEM — R94.31 ABNORMAL EKG: Status: RESOLVED | Noted: 2023-11-28 | Resolved: 2024-10-24

## 2024-10-24 PROBLEM — E11.65 TYPE 2 DIABETES MELLITUS WITH HYPERGLYCEMIA (MULTI): Status: RESOLVED | Noted: 2023-05-17 | Resolved: 2024-10-24

## 2024-10-24 PROCEDURE — 1036F TOBACCO NON-USER: CPT | Performed by: FAMILY MEDICINE

## 2024-10-24 PROCEDURE — 99396 PREV VISIT EST AGE 40-64: CPT | Performed by: FAMILY MEDICINE

## 2024-10-24 PROCEDURE — 3079F DIAST BP 80-89 MM HG: CPT | Performed by: FAMILY MEDICINE

## 2024-10-24 PROCEDURE — 3008F BODY MASS INDEX DOCD: CPT | Performed by: FAMILY MEDICINE

## 2024-10-24 PROCEDURE — 3074F SYST BP LT 130 MM HG: CPT | Performed by: FAMILY MEDICINE

## 2024-10-24 PROCEDURE — 3044F HG A1C LEVEL LT 7.0%: CPT | Performed by: FAMILY MEDICINE

## 2024-10-24 RX ORDER — DULOXETIN HYDROCHLORIDE 20 MG/1
CAPSULE, DELAYED RELEASE ORAL
Qty: 40 CAPSULE | Refills: 0 | Status: SHIPPED | OUTPATIENT
Start: 2024-10-24

## 2024-10-24 RX ORDER — DULOXETIN HYDROCHLORIDE 20 MG/1
20 CAPSULE, DELAYED RELEASE ORAL DAILY
Qty: 40 CAPSULE | Refills: 1 | Status: SHIPPED | OUTPATIENT
Start: 2024-10-24 | End: 2024-10-24

## 2024-10-24 RX ORDER — DULOXETINE 40 MG/1
40 CAPSULE, DELAYED RELEASE ORAL DAILY
Qty: 30 CAPSULE | Refills: 0 | Status: SHIPPED | OUTPATIENT
Start: 2024-10-24 | End: 2024-11-23

## 2024-10-24 ASSESSMENT — ENCOUNTER SYMPTOMS
POLYDIPSIA: 0
POLYPHAGIA: 0
NERVOUS/ANXIOUS: 0
HEADACHES: 0
DIZZINESS: 0
TREMORS: 0
WEIGHT LOSS: 1
CONFUSION: 0
HUNGER: 0
VISUAL CHANGE: 0
FATIGUE: 0
SEIZURES: 0
WEAKNESS: 0
SWEATS: 0
BLURRED VISION: 0
SPEECH DIFFICULTY: 0
BLACKOUTS: 0

## 2024-10-24 NOTE — PATIENT INSTRUCTIONS
To wean Cymbalta :   Stop Cymbalta 60 mg daily     Start Cymbalta 40 mg daily for 30 days ,  then  cymbalta 20 mg for 30 days, then cymbalta 20mg every day x 10 days , then stop .     Dr. Tarango

## 2024-10-24 NOTE — PROGRESS NOTES
Subjective   Patient ID: Ramirez Rodriguez is a 43 y.o. male who presents for Follow-up (Follow up - DM) and Annual Exam (CPE - pt states rt foot sore spot, in ER Tuesday - passed kidney stone, gastric bypass - off meds and glucose normal/Pt decline flu vaccine today.).      ROS, HPI entered by pt .+++++++++++++++++++++++++++++++++++++++  Diabetes  He has type 2 diabetes mellitus. No MedicAlert identification noted. The initial diagnosis of diabetes was made 2 years ago. Pertinent negatives for hypoglycemia include no confusion, dizziness, headaches, hunger, mood changes, nervousness/anxiousness, pallor, seizures, sleepiness, speech difficulty, sweats or tremors. Associated symptoms include weight loss. Pertinent negatives for diabetes include no blurred vision, no chest pain, no fatigue, no foot paresthesias, no foot ulcerations, no polydipsia, no polyphagia, no polyuria, no visual change and no weakness. Pertinent negatives for hypoglycemia complications include no blackouts, no hospitalization, no nocturnal hypoglycemia, no required assistance and no required glucagon injection. Symptoms are resolved. Pertinent negatives for diabetic complications include no CVA, heart disease, impotence, nephropathy, peripheral neuropathy, PVD or retinopathy. Risk factors for coronary artery disease include hypertension. When asked about current treatments, none were reported. He is compliant with treatment none of the time. His weight is decreasing steadily. He is following a generally healthy diet. Meal planning includes avoidance of concentrated sweets. He has had a previous visit with a dietitian. He participates in exercise three times a week. He monitors blood glucose at home 1-2 x per week. He monitors urine at home <1 x per month. There is no compliance with monitoring of blood glucose. There is no change in his home blood glucose trend. His breakfast blood glucose is taken between 8-9 am. His breakfast blood glucose range  "is generally  mg/dl. His lunch blood glucose is taken between 1-2 pm. His lunch blood glucose range is generally  mg/dl. His dinner blood glucose is taken between 6-7 pm. His dinner blood glucose range is generally  mg/dl. His bedtime blood glucose is taken between 10-11 pm. His bedtime blood glucose range is generally  mg/dl. His overall blood glucose range is  mg/dl. He does not see a podiatrist.Eye exam is current.       Review of Systems   Constitutional:  Positive for weight loss. Negative for fatigue.   Eyes:  Negative for blurred vision.   Cardiovascular:  Negative for chest pain.   Endocrine: Negative for polydipsia, polyphagia and polyuria.   Genitourinary:  Negative for impotence.   Skin:  Negative for pallor.   Neurological:  Negative for dizziness, tremors, seizures, speech difficulty, weakness and headaches.   Psychiatric/Behavioral:  Negative for confusion. The patient is not nervous/anxious.    Not as stresed.  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   Right heel pain with walking barefoot.  No trauma/ wound.  No pain if walking for exercise    +++++++++++++++++++++++++++++++++++++++++++++++++++++++++++++++      Objective   /83 (BP Location: Left arm, Patient Position: Sitting, BP Cuff Size: Large adult)   Pulse 68   Temp 36.6 °C (97.9 °F) (Temporal)   Resp 12   Ht 1.867 m (6' 1.5\")   Wt 137 kg (302 lb 12.8 oz)   SpO2 96%   BMI 39.41 kg/m²     Physical Exam  Vitals reviewed.   Constitutional:       General: He is not in acute distress.  Cardiovascular:      Rate and Rhythm: Normal rate and regular rhythm.      Heart sounds: Normal heart sounds.   Pulmonary:      Effort: Pulmonary effort is normal.      Breath sounds: No wheezing or rales.   Neurological:      General: No focal deficit present.      Mental Status: He is alert.     Right heel , tender, area on lateral right heel,  dark central edin     Assessment/Plan   Problem List Items Addressed This Visit          " Medium    RESOLVED: Adjustment disorder, unspecified    Relevant Medications    DULoxetine 40 mg DR capsule    DULoxetine (Cymbalta) 20 mg DR capsule    Type 2 diabetes mellitus without complication, without long-term current use of insulin (Multi)     Other Visit Diagnoses       Physical exam, annual    -  Primary    Plantar wart of right foot        Relevant Orders    Referral to Podiatry            Wellness  - preventive care and health maintenance reviewed and discussed   Declines vaccine  Continue exercise    Previous Diabetes, HTN, LYNDSEY are resolved with surgical wt loss.  Med list updated    Adj dsrd- improved, with change in occupation   Weaning from Cymbalta reviewed, and written for pt    Plantar wart suspected  Ref podiatry    JESS Tarango MD

## 2024-11-01 ENCOUNTER — APPOINTMENT (OUTPATIENT)
Dept: SURGERY | Facility: CLINIC | Age: 44
End: 2024-11-01
Payer: COMMERCIAL

## 2024-11-08 ENCOUNTER — APPOINTMENT (OUTPATIENT)
Dept: SURGERY | Facility: CLINIC | Age: 44
End: 2024-11-08
Payer: COMMERCIAL

## 2024-11-08 NOTE — PROGRESS NOTES
Surgery Date:   8/12/24  Surgeon:  Liam  Procedure:  gastric bypass         ASSESSMENT:  Current weight:      *** lb               Ht:      74    in.           BMI: ***  Previous weight:  316 lb  Initial start weight:   376 lb  EBW:   180 lb  Total weight change: *** lb  %EBW Lost:  ***%    PROGRESS:    Nutrition Interventions for last encounter (9/16/24)  1. Eat at least 70 g  of protein per day  2.          Continue to drink 64 oz. of zero calorie beverages per day  3. Continue no drinking 30 min before, during the meal and for 30 minutes after the meal  4. Increase intensity and duration of exercise  5.          Advance to transition diet. Try raw veggies, fresh fruit and lean ground beef.   6. Eat slowly, chew thoroughly  7.          Try one new food at a time.   8. Continue current vit/min regimen  9.         Attend monthly support groups    CHANGES IN TREATMENT:   Patient met goals: Yes         NO             Partially   Actions to meet previous goals:     24 hour food recall:    Breakfast:    Snack:   Lunch:    Snack:     Dinner:    Snack:   Beverages:    Alcohol:        Vitamins: 2 Equate  MVI, 1500 mg BA Calcium, and B12   Medications: see list  Physical Activity:   Nausea/Vomiting/Diarrhea/Constipation:     READINESS TO LEARN:  Motivation to learn:           Interested    Not interested  Understanding of instruction: Good      Fair     Poor  Anticipated Compliance: Good       Fair     Poor    Family Support: Unable to assess-family not present       Patient presents with post-op gastric bypass. Pt is 3 months postop.    Weight today is self reported. Patient has lost *** pounds since initial assessment accounting for ***% loss excess body weight.    Tolerating a Regular diet without difficulty.  Protein intake is not adequate for post-op individual. Fluid consumption is  not adequate. Patient is not supplementing recommended vitamin/minerals. Pt states no concerns/difficulties at this time.  Encouraged  monthly SG meetings.          Malnutrition Screening  Significant unintentional weight loss? n/a  Eating less than 75% of usual intake for more than 2 weeks? n/a      Nutrition Diagnosis:   1. Increased protein and nutrition needs related to altered GI function as evidenced by pt. s/p gastric bypass.  2. Food- and nutrition-related knowledge deficit related to lack of prior exposure to surgical weight loss information as evidenced by diet recall.       Nutrition Interventions:   1. Modify type and amount of food and nutrients within meals and snacks.  2. Comprehensive Nutrition Education  -Nutrition education materials: SG Schedule, today's recap      Recommendations:    1.         Great Job! You have lost  ------ pounds since starting this program!  2. Eat 60-70 g of protein per day  3. Continue to drink 64 oz. of zero calorie beverages per day  4. Continue no drinking 30 min before, during the meal and for 30 minutes after the meal  5. Continue to follow vit/min regimen   6. Continue to exercise  7.         Attend our Virtual Support Group monthly!    Nutrition Monitoring and Evaluation:   Weight loss1-2 pounds per week  Criteria: weight check, food recall  Need for Follow-up: 6 months post op

## 2024-11-11 ENCOUNTER — TELEPHONE (OUTPATIENT)
Dept: SURGERY | Facility: CLINIC | Age: 44
End: 2024-11-11
Payer: COMMERCIAL

## 2024-11-11 NOTE — TELEPHONE ENCOUNTER
Attempted to  call patient , unable to leave message to reschedule cxl'd 3 month post op appointment with CLAYTON

## 2024-11-11 NOTE — PROGRESS NOTES
[facility]AT[facility]GERY CLINIC  Comprehensive Weight Management        Patient: Ramirez Rodriguez   MRN: [id number]     Index Surgery:  Date: 08/12/2024  Surgeon: Dr. Wheeler  Procedure: RYGB    Virtual or Telephone Consent:   An interactive audio and video telecommunication system which permits real time communications between the patient (at the originating site) and provider (at the distant site) was utilized to provide this telehealth service. Verbal consent was requested and obtained from Ramirez Rodriguez on this date, 11/11/24, for a telehealth (audio and video) virtual visit.     HPI   Ramirez Rodriguez is a 43 y.o. male presenting for 3 mo pov s/p RYGB.     Diet:  - Stage: regular food diet  - Achieving protein and fluid goals most days: yes     Exercise:  - walking, increasing as tolerated    Concerns related to:  Nausea/Vomiting, Reflux: denies  Abdominal Pain: denies  Diarrhea/Constipation- bowel function is regular    Daily Supplements:  Calcium: Calcium Citrate w/ vitamin D (1200 - 1500mg)  Multivitamin & Minerals: 2 per day  Vitamin B12: 500 mcg/day   Vitamin D3: 3000 units   Iron/Other: iron supplement   PPI: taking    Primary Medical Hx:  Patient Active Problem List   Diagnosis    Type 2 diabetes mellitus without complication, without long-term current use of insulin (Multi)    Hyperlipidemia, mild    Bariatric surgery status    Morbid (severe) obesity due to excess calories (Multi)    Status post gastric bypass for obesity    Postoperative malabsorption (HHS-HCC)      Past Surgical History:   Procedure Laterality Date    APPENDECTOMY  12/04/2023    BARIATRIC SURGERY  08/12/2024    ROBOT ASSISTED LAPAROSCOPIC GASTRIC BYPASS/EGD/TAP BLOCK 21871 - [place] LAPS GSTR RSTCV PX W/BYP DONNA-EN-Y LIMB <150 CM (Dr. Tiny Wheeler @ [facility])    SHOULDER Right 2012      Social History     Socioeconomic History    Marital status:      Spouse name: Not on file    Number of children: Not on file    Years of education: Not on file     Highest education level: Not on file   Occupational History    Not on file   Tobacco Use    Smoking status: Never    Smokeless tobacco: Never   Vaping Use    Vaping status: Never Used   Substance and Sexual Activity    Alcohol use: Not Currently     Comment: Socially    Drug use: Never    Sexual activity: Not Currently   Other Topics Concern    Not on file   Social History Narrative    Not on file     Social Drivers of Health     Financial Resource Strain: Low Risk  (8/12/2024)    Overall Financial Resource Strain (CARDIA)     Difficulty of Paying Living Expenses: Not very hard   Food Insecurity: Not on file   Transportation Needs: No Transportation Needs (8/12/2024)    PRAPARE - Transportation     Lack of Transportation (Medical): No     Lack of Transportation (Non-Medical): No   Physical Activity: Not on file   Stress: Not on file   Social Connections: Not on file   Intimate Partner Violence: Not on file   Housing Stability: Low Risk  (8/12/2024)    Housing Stability Vital Sign     Unable to Pay for Housing in the Last Year: No     Number of Times Moved in the Last Year: 1     Homeless in the Last Year: No     Family History   Problem Relation Name Age of Onset    Heart disease Mother Haritha     Stroke Mother Haritha     Hypertension Mother Haritha     Alcohol abuse Other      Stroke Other      Diabetes Other Grandfather         Does not specify which one    Diabetes Paternal Grandfather Ramirez       Current Outpatient Medications on File Prior to Visit   Medication Sig Dispense Refill    albuterol 90 mcg/actuation inhaler INHALE 2 PUFFS BY MOUTH PRIOR TO EXERTION/ EXERCISE AND EVERY 4 TO 6 HOURS AS NEEDED FOR COUGH OR SHORTNESS OF BREATH      calcium citrate-vitamin D3 500 mg-12.5 mcg (500 unit) tablet,chewable Chew 1 tablet 3 times a day.      cyanocobalamin (Vitamin B-12) 500 mcg tablet Take 1 tablet (500 mcg) by mouth once daily.      DULoxetine (Cymbalta) 20 mg DR capsule Take 20 mg once a day for 30 days then  20  "mg every other day for 10 days  then stop 40 capsule 0    DULoxetine 40 mg DR capsule Take 1 capsule (40 mg) by mouth once daily. 30 capsule 0    multivit-min/iron/folic acid/K (BARIATRIC MULTIVITAMINS ORAL) Take 1 tablet by mouth 1 time. Bariatric advantage chewable      omeprazole (PriLOSEC) 40 mg DR capsule Take 1 capsule (40 mg) by mouth once daily in the morning. Take before meals. Do not crush or chew. Open capsule, sprinkle beads on Sugar Free jello, pudding or applesauce. 90 capsule 1     No current facility-administered medications on file prior to visit.      Allergies   Allergen Reactions    Penicillins Anaphylaxis     STATES THROAT CLOSES      REVIEW OF SYSTEMS:  Negative unless otherwise reviewed in HPI.    PHYSICAL EXAM   Physical Exam   Ht: 6'2\"  Wt: 316 lbs *** BMI: ***  Alert, well appearing, no acute distress, nourished, hydrated.  Anicteric sclera, no ptosis  Facial symmetry  Neck supple  Unlabored respirations.  Easily conversant without increased respiratory effort  Oriented to person, place, time.  Judgement intact.  Appropriate mood, affect.     ASSESSMENT & PLAN  43 y.o. male presenting for 3 mo pov s/p RYGB.     Weight Impression:  -Initial Weight: 376 lbs   -Today's Weight: 316 lbs ***  -%Total Weight Loss: -16%  ***    Problem List Items Addressed This Visit       Type 2 diabetes mellitus without complication, without long-term current use of insulin (Multi)     Recommend controlled CHO Diet to improve glycemic control.    Carbohydrate portions at meals <40g per meal depending on activity level reviewed.  Avoid sugar sweetened beverages  Engage in regular aerobic exercise at least 150 minutes per week for CV benefit.  Encourage self monitoring of blood glucose values  Optimal values of blood glucose:  Fasting < 90  PreMeal < 100  30 minutes post meal < 140  60 minutes post meal < 120  90 minutes post meal < 100    Discussed roles of combining dietary protein, increased dietary fiber and " reduction of simple carbohydrates to benefit glycemic control.   Discussed importance of regular exercise for glycemic control.   Ongoing follow up with PCP, Endo and speciality providers for retinopathy & nephropathy screening encouraged annually.          Hyperlipidemia, mild     Limiting dietary saturated fat encouraged <5-10% total kcal.  Increase dietary fiber to a minimum goal 25-30g per day.  Emphasis on whole foods based dietary carbohydrates.  Controlled carbohydrate intake reviewed for triglyceride reduction.    May consider high quality fish oil 2g EPA/DHA per day for CV benefit.  Regular exercise encouraged for CV risk reduction at least 150 min/week of moderate intense aerobic exercise encouraged.   Regular follow up with PCP/cardiology for lipid monitoring, CV risk reduction and appropriate lipid lowering therapies.         Status post gastric bypass for obesity     Patient is doing well   3 mo s/p RYGB   no acute issues   takes supplements   does regular exercise  no pain or GERD   on PPI     Recs:      doing well  No acute issues   advised to continue Diet,   increase exercise,  FU with dietitian   continue vitamin supplementation, PPI   support groups  FU 3 mo for 6 mo pov         Postoperative malabsorption (HHS-HCC) - Primary     Check micronutrient levels - see orders  Adjust micronutrient supplementation per results  Continue recommended post bariatric surgery supplements        Please see Patient Instructions for today's relevant referrals, orders and instructions.      Follow Up: No follow-ups on file.     Dyan Kc, APRN-CNP

## 2024-11-11 NOTE — ASSESSMENT & PLAN NOTE
Patient is doing well   3 mo s/p RYGB   no acute issues   takes supplements   does regular exercise  no pain or GERD   on PPI     Recs:      doing well  No acute issues   advised to continue Diet,   increase exercise,  FU with dietitian   continue vitamin supplementation, PPI   support groups  FU 3 mo for 6 mo pov

## 2024-11-12 ENCOUNTER — APPOINTMENT (OUTPATIENT)
Dept: SURGERY | Facility: CLINIC | Age: 44
End: 2024-11-12
Payer: COMMERCIAL

## 2024-11-12 DIAGNOSIS — E11.9 TYPE 2 DIABETES MELLITUS WITHOUT COMPLICATION, WITHOUT LONG-TERM CURRENT USE OF INSULIN (MULTI): ICD-10-CM

## 2024-11-12 DIAGNOSIS — Z98.84 STATUS POST GASTRIC BYPASS FOR OBESITY: ICD-10-CM

## 2024-11-12 DIAGNOSIS — K91.2 POSTOPERATIVE MALABSORPTION (HHS-HCC): Primary | ICD-10-CM

## 2024-11-12 DIAGNOSIS — E78.5 HYPERLIPIDEMIA, MILD: ICD-10-CM

## 2024-11-15 ENCOUNTER — TELEPHONE (OUTPATIENT)
Dept: SURGERY | Facility: CLINIC | Age: 44
End: 2024-11-15

## 2024-11-29 ENCOUNTER — APPOINTMENT (OUTPATIENT)
Dept: PRIMARY CARE | Facility: CLINIC | Age: 44
End: 2024-11-29
Payer: COMMERCIAL

## 2024-12-12 NOTE — PROGRESS NOTES
Subjective   Patient ID: Ramirez Rodriguez is a 43 y.o. male who presents for Mouth Lesions (Woke up Wednesday with a sore on his lip now the whole right side of neck hurts to turn neck /Declines the flu shot ).    HPI     Wednesday morning he woke up with sore on upper mid lip-   Has a hx of 1 cold sore like 23 years ago- none since   Has been tired past few days   Denies sore throat, congestion, ear pain, fever   Kids have been sick with URI symptoms recently   Trying abreeva  Lip feels swollen and glands on right side of neck feel swollen as well   No other lesions on face or neck   Hx chicken pox, no hx shingles   No sores inside mouth       Current Outpatient Medications   Medication Sig Dispense Refill    albuterol 90 mcg/actuation inhaler INHALE 2 PUFFS BY MOUTH PRIOR TO EXERTION/ EXERCISE AND EVERY 4 TO 6 HOURS AS NEEDED FOR COUGH OR SHORTNESS OF BREATH      calcium citrate-vitamin D3 500 mg-12.5 mcg (500 unit) tablet,chewable Chew 1 tablet 3 times a day.      cyanocobalamin (Vitamin B-12) 500 mcg tablet Take 1 tablet (500 mcg) by mouth once daily.      DULoxetine (Cymbalta) 20 mg DR capsule Take 20 mg once a day for 30 days then  20 mg every other day for 10 days  then stop 40 capsule 0    DULoxetine 40 mg DR capsule Take 1 capsule (40 mg) by mouth once daily. 30 capsule 0    multivit-min/iron/folic acid/K (BARIATRIC MULTIVITAMINS ORAL) Take 1 tablet by mouth 1 time. Bariatric advantage chewable      omeprazole (PriLOSEC) 40 mg DR capsule Take 1 capsule (40 mg) by mouth once daily in the morning. Take before meals. Do not crush or chew. Open capsule, sprinkle beads on Sugar Free jello, pudding or applesauce. 90 capsule 1    valACYclovir (Valtrex) 1 gram tablet Take 1 tablet (1,000 mg) by mouth 3 times a day for 7 days. 21 tablet 0     No current facility-administered medications for this visit.       Review of Systems   Constitutional:  Positive for fatigue. Negative for chills and fever.   HENT:  Negative for  congestion, ear pain, mouth sores and sore throat.    Respiratory:  Negative for cough and shortness of breath.    Cardiovascular:  Negative for chest pain.   Musculoskeletal:  Positive for neck pain and neck stiffness.   Skin:  Positive for rash.   Hematological:  Positive for adenopathy.           Objective   BP (!) 159/93 (BP Location: Right arm, Patient Position: Sitting, BP Cuff Size: Adult)   Pulse 80   Temp 35.9 °C (96.6 °F) (Temporal)   Resp 16   Wt 133 kg (293 lb 14.4 oz)   SpO2 98%   BMI 38.25 kg/m²     Physical Exam    Constitutional: Well developed, well nourished, alert and in no acute distress.  Head and Face: NC/AT  Eyes: Normal external exam. Pupils equally round and reactive to light with normal accommodation and extraocular movements intact.  ENT: External inspection of ears normal, tympanic membranes visualized and normal. Oral mucosa moist, oropharynx clear without tonsillar exudate or erythema. No mucosal ulcers.  There is a single vesicular crusted lesion upper mid-right lip.    Neck: Supple. +tender right anterior cervical lymphadenopathy.  Cardiovascular: Regular rate and rhythm, normal S1 and S2, no murmurs, gallops, or rubs.   Pulmonary: No respiratory distress, lungs clear to auscultation bilaterally. No wheezes, rhonchi, rales.  Skin: Warm, well perfused, normal skin turgor and color.   Neurologic: Cranial nerves II-XII grossly intact.      Assessment/Plan     Problem List Items Addressed This Visit    None  Visit Diagnoses       Herpes labialis    -  Primary    Relevant Medications    valACYclovir (Valtrex) 1 gram tablet    Herpes zoster without complication        Relevant Medications    valACYclovir (Valtrex) 1 gram tablet    Cervical adenopathy              HSV vs herpes zoster- will treat with valtrex 1,000 mg TID x 7 days   Follow up if cervical adenopathy does not resolve with above treatment      Adrienen Francis, DO  12/13/2024

## 2024-12-13 ENCOUNTER — OFFICE VISIT (OUTPATIENT)
Dept: PRIMARY CARE | Facility: CLINIC | Age: 44
End: 2024-12-13
Payer: COMMERCIAL

## 2024-12-13 VITALS
SYSTOLIC BLOOD PRESSURE: 159 MMHG | RESPIRATION RATE: 16 BRPM | OXYGEN SATURATION: 98 % | DIASTOLIC BLOOD PRESSURE: 93 MMHG | HEART RATE: 80 BPM | WEIGHT: 293.9 LBS | BODY MASS INDEX: 38.25 KG/M2 | TEMPERATURE: 96.6 F

## 2024-12-13 DIAGNOSIS — B02.9 HERPES ZOSTER WITHOUT COMPLICATION: ICD-10-CM

## 2024-12-13 DIAGNOSIS — B00.1 HERPES LABIALIS: Primary | ICD-10-CM

## 2024-12-13 DIAGNOSIS — R59.0 CERVICAL ADENOPATHY: ICD-10-CM

## 2024-12-13 PROCEDURE — 1036F TOBACCO NON-USER: CPT | Performed by: FAMILY MEDICINE

## 2024-12-13 PROCEDURE — 99213 OFFICE O/P EST LOW 20 MIN: CPT | Performed by: FAMILY MEDICINE

## 2024-12-13 PROCEDURE — 3044F HG A1C LEVEL LT 7.0%: CPT | Performed by: FAMILY MEDICINE

## 2024-12-13 PROCEDURE — 3080F DIAST BP >= 90 MM HG: CPT | Performed by: FAMILY MEDICINE

## 2024-12-13 PROCEDURE — 3077F SYST BP >= 140 MM HG: CPT | Performed by: FAMILY MEDICINE

## 2024-12-13 RX ORDER — VALACYCLOVIR HYDROCHLORIDE 1 G/1
1000 TABLET, FILM COATED ORAL 3 TIMES DAILY
Qty: 21 TABLET | Refills: 0 | Status: SHIPPED | OUTPATIENT
Start: 2024-12-13 | End: 2024-12-20

## 2024-12-13 ASSESSMENT — ENCOUNTER SYMPTOMS
SHORTNESS OF BREATH: 0
FATIGUE: 1
SORE THROAT: 0
CHILLS: 0
NECK STIFFNESS: 1
NECK PAIN: 1
COUGH: 0
FEVER: 0
ADENOPATHY: 1

## 2025-01-19 DIAGNOSIS — Z98.84 BARIATRIC SURGERY STATUS: ICD-10-CM

## 2025-01-20 PROCEDURE — RXMED WILLOW AMBULATORY MEDICATION CHARGE

## 2025-01-20 RX ORDER — OMEPRAZOLE 40 MG/1
40 CAPSULE, DELAYED RELEASE ORAL
Qty: 90 CAPSULE | Refills: 1 | Status: SHIPPED | OUTPATIENT
Start: 2025-01-20 | End: 2025-07-19

## 2025-01-22 ENCOUNTER — PHARMACY VISIT (OUTPATIENT)
Dept: PHARMACY | Facility: CLINIC | Age: 45
End: 2025-01-22
Payer: COMMERCIAL

## 2025-01-28 ENCOUNTER — APPOINTMENT (OUTPATIENT)
Dept: PODIATRY | Facility: CLINIC | Age: 45
End: 2025-01-28
Payer: COMMERCIAL

## 2025-01-28 DIAGNOSIS — B35.1 TINEA UNGUIUM: Primary | ICD-10-CM

## 2025-01-28 DIAGNOSIS — M79.674 TOE PAIN, RIGHT: ICD-10-CM

## 2025-01-28 DIAGNOSIS — B07.0 PLANTAR WART OF RIGHT FOOT: ICD-10-CM

## 2025-01-28 DIAGNOSIS — M79.675 TOE PAIN, LEFT: ICD-10-CM

## 2025-01-28 PROCEDURE — 1036F TOBACCO NON-USER: CPT | Performed by: PODIATRIST

## 2025-01-28 PROCEDURE — 99202 OFFICE O/P NEW SF 15 MIN: CPT | Performed by: PODIATRIST

## 2025-01-28 RX ORDER — SILVER SULFADIAZINE 10 G/1000G
CREAM TOPICAL 2 TIMES DAILY
Qty: 50 G | Refills: 0 | Status: SHIPPED | OUTPATIENT
Start: 2025-01-28

## 2025-01-28 NOTE — PROGRESS NOTES
CC:  painful thickened and elongated toenails    HPI:  Pt seen as new pt presents complaining painful thickened and elongated toenails that are difficult to manage.  Onset was gradual with worsening course until recently.  Aggravated by shoe gear and ambulation.   Had wart right heel. Resolved, was dm until his gastric bypass surgery.    PCP: Dr. Tarango  Last visit: 10-24-24     PMH  Past Medical History:   Diagnosis Date    Abnormal EKG 11/28/2023    Pt with deep ,lateral T wave inversions which are unchanged vs 2/2022 EKG  After last EKG had echo and stress-echo was OK,stress echo was indeterminant(inadequete exercise tolerance)-plan Lexiscan stress test      Diabetes mellitus (Multi)     HTN (hypertension)     Hyperlipidemia     Hypertension 03/21/2023    Adherent to therapy.  Has a home monitor .   BP good in office today      Morbid obesity (Multi)     Morbid obesity (Multi)     Pulmonary arterial hypertension (Multi)     Severe obstructive sleep apnea      MEDS    Current Outpatient Medications:     albuterol 90 mcg/actuation inhaler, INHALE 2 PUFFS BY MOUTH PRIOR TO EXERTION/ EXERCISE AND EVERY 4 TO 6 HOURS AS NEEDED FOR COUGH OR SHORTNESS OF BREATH, Disp: , Rfl:     calcium citrate-vitamin D3 500 mg-12.5 mcg (500 unit) tablet,chewable, Chew 1 tablet 3 times a day., Disp: , Rfl:     cyanocobalamin (Vitamin B-12) 500 mcg tablet, Take 1 tablet (500 mcg) by mouth once daily., Disp: , Rfl:     DULoxetine (Cymbalta) 20 mg DR capsule, Take 20 mg once a day for 30 days then  20 mg every other day for 10 days  then stop, Disp: 40 capsule, Rfl: 0    DULoxetine 40 mg DR capsule, Take 1 capsule (40 mg) by mouth once daily., Disp: 30 capsule, Rfl: 0    multivit-min/iron/folic acid/K (BARIATRIC MULTIVITAMINS ORAL), Take 1 tablet by mouth 1 time. Bariatric advantage chewable, Disp: , Rfl:     omeprazole (PriLOSEC) 40 mg DR capsule, Take 1 capsule (40 mg) by mouth once daily in the morning. Take before meals. Do not crush or  chew. Open capsule, sprinkle beads on Sugar Free jello, pudding or applesauce., Disp: 90 capsule, Rfl: 1  Allergies  Allergies   Allergen Reactions    Penicillins Anaphylaxis     STATES THROAT CLOSES     Social History     Socioeconomic History    Marital status:    Tobacco Use    Smoking status: Never     Passive exposure: Never    Smokeless tobacco: Never   Vaping Use    Vaping status: Never Used   Substance and Sexual Activity    Alcohol use: Not Currently     Comment: Socially    Drug use: Never    Sexual activity: Not Currently     Partners: Female     Social Drivers of Health     Financial Resource Strain: Low Risk  (8/12/2024)    Overall Financial Resource Strain (CARDIA)     Difficulty of Paying Living Expenses: Not very hard   Transportation Needs: No Transportation Needs (8/12/2024)    PRAPARE - Transportation     Lack of Transportation (Medical): No     Lack of Transportation (Non-Medical): No   Housing Stability: Low Risk  (8/12/2024)    Housing Stability Vital Sign     Unable to Pay for Housing in the Last Year: No     Number of Times Moved in the Last Year: 1     Homeless in the Last Year: No     Family History   Problem Relation Name Age of Onset    Heart disease Mother Haritha     Stroke Mother Haritha     Hypertension Mother Haritha     Alcohol abuse Other      Stroke Other      Diabetes Other Grandfather         Does not specify which one    Diabetes Paternal Grandfather Ramirez      Past Surgical History:   Procedure Laterality Date    APPENDECTOMY  12/04/2023    BARIATRIC SURGERY  08/12/2024    ROBOT ASSISTED LAPAROSCOPIC GASTRIC BYPASS/EGD/TAP BLOCK 63185 - [place] LAPS GSTR RSTCV PX W/BYP DONNA-EN-Y LIMB <150 CM (Dr. Tiny Wheeler @ [facility])    SHOULDER Right 2012       REVIEW OF SYSTEMS    DERM:   + as noted in HPI.       Physical examination:   On General Observation: Patient is a pleasant, cooperative, well developed 44 y.o.  adult male. The patient is alert and oriented to time, place and person.  Patient has normal affect and mood.  There were no vitals taken for this visit.    Vascular:  DP and PT pulses are 2/4 b/l.  no edema noted. no varicosities b/l.  CFT  5 seconds to all digits bilateral.  Skin temperature is warm to warm from proximal to distal bilateral.      Muscular: Strength is 5/5 for all instrinsic and extrinsic muscle groups.     Neuro:  Proprioception present.   Sensation to vibration is  intact. Protective sensation present  at all pedal sites via [place] 5.07 monofilament bilateral.  Light touch present bilateral.     Derm:    Decreased hair growth b/l le  Left toenails: 1 Brittleness, crumbling upon debridement, subungual debris, elongation, mycotic appearance, tenderness, and thickness.   Right toenails: 1 Brittleness, crumbling upon debridement, subungual debris, elongation, mycotic appearance, tenderness, and thickness.   Hallux nails are loose distally.    ASSESSMENT:    Tinea Unguium [B35.1]   Pain in right toe(s) [M79.674]   Pain in left toe(s) [M79.675]       PLAN:   Consult  A comprehensive history and physical examination were preformed. The patient was educated on clinical findings, diagnosis and treatment plans. Patient understands all that has been explained and all questions were answered to apparent satisfaction.   -Reviewed etiology and treatment options, oral vs topical vs removing the toe nails, will schedule for p/a of the hallux nails.        Edy Hercules DPM

## 2025-03-11 ENCOUNTER — APPOINTMENT (OUTPATIENT)
Dept: PODIATRY | Facility: CLINIC | Age: 45
End: 2025-03-11
Payer: COMMERCIAL

## 2025-03-26 ENCOUNTER — APPOINTMENT (OUTPATIENT)
Dept: PODIATRY | Facility: CLINIC | Age: 45
End: 2025-03-26
Payer: COMMERCIAL

## 2025-03-26 DIAGNOSIS — B35.1 TINEA UNGUIUM: Primary | ICD-10-CM

## 2025-03-26 DIAGNOSIS — M79.675 TOE PAIN, LEFT: ICD-10-CM

## 2025-03-26 DIAGNOSIS — L60.0 INGROWING NAIL: ICD-10-CM

## 2025-03-26 DIAGNOSIS — M79.674 TOE PAIN, RIGHT: ICD-10-CM

## 2025-03-26 PROCEDURE — 11750 EXCISION NAIL&NAIL MATRIX: CPT | Performed by: PODIATRIST

## 2025-03-26 PROCEDURE — 1036F TOBACCO NON-USER: CPT | Performed by: PODIATRIST

## 2025-03-26 NOTE — PROGRESS NOTES
Patient ID: Ramirez Rodriguez is a 44 y.o. male.    Nail Removal    Date/Time: 3/26/2025 9:15 AM    Performed by: Edy Hercules DPM  Authorized by: Edy Hercules DPM    Consent:     Consent obtained:  Written    Consent given by:  Patient    Risks, benefits, and alternatives were discussed: yes      Risks discussed:  Bleeding, incomplete removal, infection, pain and permanent nail deformity    Alternatives discussed:  No treatment, delayed treatment, alternative treatment, observation and referral  Universal protocol:     Procedure explained and questions answered to patient or proxy's satisfaction: yes      Relevant documents present and verified: yes      Test results available: yes      Imaging studies available: yes      Required blood products, implants, devices, and special equipment available: yes      Site/side marked: yes      Immediately prior to procedure, a time out was called: yes      Patient identity confirmed:  Verbally with patient  Pre-procedure details:     Skin preparation:  Povidone-iodine    Preparation: Patient was prepped and draped in the usual sterile fashion    Procedure details:     Location: b/l hallux.  Anesthesia:     Anesthesia method:  Nerve block    Block needle gauge:  25 G    Block anesthetic:  Lidocaine 2% w/o epi and bupivacaine 0.5% w/o epi (3cc 50/50)    Block technique:  Hallux block    Block injection procedure:  Anatomic landmarks identified    Block outcome:  Anesthesia achieved  Nail Removal:     Nail removed:  Complete  Ingrown nail:     Wedge excision of skin: yes      Nail matrix removed or ablated:  Complete  Post-procedure details:     Dressing:  4x4 sterile gauze, antibiotic ointment and gauze roll    Procedure completion:  Tolerated well, no immediate complications  Comments:      Ingrown Consent  We discussed the procedure in detail.  We discussed all risk, benefits and complications of moving forward.  They understand risks include bleeding, infection,  worsening pain, ongoing pain, disfigurement of the nail, abnormal growth of the nail, nail spicule, recurrence, loss of the nail and need for further procedures.  Patient verbally consents to all of the above today.    -The nail elevator was used to free the proximal nail fold from the nail plate below it.  -Then, the nail elevator was inserted under the nail plate of the ingrown part of the toenail to separate the nail plate from the nail bed. The b/l hallux nail was avulsed.  -A curved hemostat was then advanced under the free portion of the nail, as far as possible to grab the entire nail. It was then clamped down and the hallux nail avulsed. The entire piece was confirmed to be fully removed intact.  -Hemostasis was achieved through direct pressure.  Phenol times 3 at 30 sec each hallux nail was used to cauterize the matrix, hydrogen peroxide was used to irrigate the toe, silvadene was used.  A dressing was applied to the area. The patient tolerated the procedure well without complications. Follow-up visit scheduled, pt has the silvadene, rx for soaks and wound care, follow up 2 weeks.

## 2025-04-08 ENCOUNTER — APPOINTMENT (OUTPATIENT)
Dept: PODIATRY | Facility: CLINIC | Age: 45
End: 2025-04-08
Payer: COMMERCIAL

## 2025-04-16 ENCOUNTER — OFFICE VISIT (OUTPATIENT)
Dept: PRIMARY CARE | Facility: CLINIC | Age: 45
End: 2025-04-16
Payer: COMMERCIAL

## 2025-04-16 VITALS
WEIGHT: 271.8 LBS | DIASTOLIC BLOOD PRESSURE: 91 MMHG | TEMPERATURE: 96.5 F | BODY MASS INDEX: 34.88 KG/M2 | OXYGEN SATURATION: 97 % | SYSTOLIC BLOOD PRESSURE: 153 MMHG | HEIGHT: 74 IN | RESPIRATION RATE: 18 BRPM | HEART RATE: 75 BPM

## 2025-04-16 DIAGNOSIS — H72.91 PERFORATION OF RIGHT TYMPANIC MEMBRANE: ICD-10-CM

## 2025-04-16 DIAGNOSIS — H66.90 ACUTE OTITIS MEDIA, UNSPECIFIED OTITIS MEDIA TYPE: ICD-10-CM

## 2025-04-16 DIAGNOSIS — J01.80 OTHER ACUTE SINUSITIS, RECURRENCE NOT SPECIFIED: ICD-10-CM

## 2025-04-16 DIAGNOSIS — H92.09 OTALGIA, UNSPECIFIED LATERALITY: Primary | ICD-10-CM

## 2025-04-16 DIAGNOSIS — H73.92 ABNORMAL TYMPANIC MEMBRANE OF LEFT EAR: ICD-10-CM

## 2025-04-16 DIAGNOSIS — H92.02 LEFT EAR PAIN: ICD-10-CM

## 2025-04-16 DIAGNOSIS — J02.9 PHARYNGITIS, UNSPECIFIED ETIOLOGY: ICD-10-CM

## 2025-04-16 PROCEDURE — 3077F SYST BP >= 140 MM HG: CPT | Performed by: FAMILY MEDICINE

## 2025-04-16 PROCEDURE — 99214 OFFICE O/P EST MOD 30 MIN: CPT | Performed by: FAMILY MEDICINE

## 2025-04-16 PROCEDURE — 3008F BODY MASS INDEX DOCD: CPT | Performed by: FAMILY MEDICINE

## 2025-04-16 PROCEDURE — 1036F TOBACCO NON-USER: CPT | Performed by: FAMILY MEDICINE

## 2025-04-16 PROCEDURE — 3080F DIAST BP >= 90 MM HG: CPT | Performed by: FAMILY MEDICINE

## 2025-04-16 RX ORDER — DOXYCYCLINE 100 MG/1
100 CAPSULE ORAL 2 TIMES DAILY
Qty: 20 CAPSULE | Refills: 0 | Status: SHIPPED | OUTPATIENT
Start: 2025-04-16 | End: 2025-04-26

## 2025-04-16 NOTE — PROGRESS NOTES
"PRIMARY CARE- OFFICE VISIT                                                          Subjective            Subjective   HPI:    Ramirez Rodriguez. Is 44 y.o.. male. Here for acute visit-     PCP is - Dr Tarango pt          Chief Complaint   Patient presents with    ear infection    Sore Throat         What concern/ problem/pain/symptom  brings you here today?  Pt presents for ear infection, sore thraot    how long has pt had sxs?  1 week    describe symptoms-  Coughing, running nose, flem, cold sweats  fever-yes  nausea-no  vomiting-no      Are symptoms all day ? yes  Do symptoms occur at night? yes      has pt tried anything for current symptoms, including medications (OTC or prescription)  ?    Pt has taken municex, Ibuprofen     what makes symptoms worse?  Blowing nose    Does anything make symptoms better?  no    has pt been seen recently for this problem ( within past 2-3 weeks) ?   no  if yes- where?    by who?    what treatment was provided?      Per pt history ear tubes/scarring as a child       Tobacco Use History[1]    Social History     Substance and Sexual Activity   Alcohol Use Not Currently    Comment: Socially          Review of Systems                                                             Objective      Objective            Vitals:    04/16/25 1108   BP: (!) 153/91   BP Location: Left arm   Patient Position: Sitting   BP Cuff Size: Adult   Pulse: 75   Resp: 18   Temp: 35.8 °C (96.5 °F)   TempSrc: Temporal   SpO2: 97%   Weight: 123 kg (271 lb 12.8 oz)   Height: 1.867 m (6' 1.5\")           PHYSICAL:      Pt is A and O x3, NAD, Vital signs are within normal limits  General Appearance- normal , good hygiene,talks easily  EYES- conjunctiva and lids- normal  EARS/NOSE-TM- left-red /dull , small red cystic lesion inferior eardrum   TM- right-mild erythems- healed scarring small perf - no signs active infection or drainage   head normocephalic and atraumatic, nasopharynx-green nasal discharge, no trismus, no " hot potato voice  OROPHARYNX- no exudate  NECK- supple, FROM  LYMPH- no cervical lymph nodes palpated   CV- RRR without murmur  PULM- CTA bilaterally  Respiratory effort- normal respiratory effort , no retractions or nasal flaring   ABDOMEN- normoactive BS's   EXTREMITIES-no edema,NT  SKIN- no abnormal skin lesions on inspection or palpation   NEURO- no focal deficits  PSYCH- pleasant, normal judgement and insight      BP Readings from Last 3 Encounters:   04/16/25 (!) 153/91   12/13/24 (!) 159/93   10/24/24 127/83         Wt Readings from Last 3 Encounters:   04/16/25 123 kg (271 lb 12.8 oz)   12/13/24 133 kg (293 lb 14.4 oz)   10/24/24 137 kg (302 lb 12.8 oz)       BMI Readings from Last 3 Encounters:   04/16/25 35.37 kg/m²   12/13/24 38.25 kg/m²   10/24/24 39.41 kg/m²           The number and complexity of problems addressed is considered moderate.  The amount and/or complexity of data reviewed and analyzed is considered moderate. The risk of complications and/or morbidity/mortality of patient is considered moderate. Overall, this patient encounter is considered a moderate risk visit.      What are antibiotics?  Antibiotics are medicines that help people fight infections caused by bacteria. They work by killing bacteria that are in the body. These medicines come in many different forms, including pills, ointments, and liquids that are given by injection.    Antibiotics can do a lot of good. For people with serious bacterial infections, antibiotics can save lives. But people use them far too often, even when they're not needed. This is causing a very serious problem called antibiotic resistance. Antibiotic resistance happens when bacteria that have been exposed to an antibiotic change so that the antibiotic can no longer kill them. In those bacteria, the antibiotic has no effect. Because of this problem, doctors are having a harder and harder time treating infections. Experts worry that there will soon be  infections that don't respond to any antibiotics.    When are antibiotics helpful?  Antibiotics can help people fight off infections caused by bacteria. They do not work on infections caused by viruses.    Some common bacterial infections that are treated with antibiotics include:    Strep throat    Pneumonia (an infection of the lungs)    Bladder infections    Infections you catch through sex, such as gonorrhea and chlamydia    When are antibiotics NOT helpful?    Antibiotics do not work on infections caused by viruses.    Antibiotics are not helpful for the common cold, because the common cold is caused by a virus.    Antibiotics are not helpful for the flu, because the flu is caused by a virus. (People with the flu can be treated with medicines called antiviral medicines, which are different from antibiotics.)      Even though antibiotics don't work on infections caused by viruses, people sometimes believe that they do. That's because they took antibiotics for a viral infection before and then got better. The problem is that those people would have gotten better with or without an antibiotic. When they get better with the antibiotic, they think that's what cured them, when in reality the antibiotic had nothing to do with it.    What's the harm in taking antibiotics even if they might not help?  There are many reasons you should not take antibiotics unless you absolutely need them:    Antibiotics cause side effects, such as nausea, vomiting, and diarrhea. They can even make it more likely that you will get a different kind of infection, such as yeast infection (if you are a woman).    Allergies to antibiotics are common. You can develop an allergy to an antibiotic, even if you have not had a problem with it before. Some allergies are just unpleasant, causing rashes and itching. But some can be very serious and even life-threatening. It is better to avoid any risk of an allergy, if the antibiotic wouldn't help you  "anyway.    Overuse of antibiotics leads to antibiotic resistance. Using antibiotics when they are not needed gives bacteria a chance to change, so that the antibiotics cannot hurt them later on. People who have infections caused by antibiotic-resistant bacteria often have to be treated in the hospital with many different antibiotics. People can even die from these infections, because there is no antibiotic that will cure them.    When should I take antibiotics?  You should take antibiotics only when a doctor or nurse prescribes them to you. You should never take antibiotics prescribed to someone else, and you should not take antibiotics that were prescribed to you for a previous illness. When prescribing an antibiotic, doctors and nurses have to carefully pick the right antibiotic for a particular infection. Not all antibiotics work on all bacteria.    If you do have an infection caused by a bacteria, your doctor or nurse might want to find out what that bacteria is, and which antibiotics can kill it. They do this by taking a \"culture\" of the bacteria and growing it in the lab. But it's not possible to do a culture on someone who has already started an antibiotic. So if you start an antibiotic without input from a doctor or nurse it will be impossible to know if you have taken the right one.    What can I do to reduce antibiotic resistance?  Here are some things that can help:    Do not pressure your doctor or nurse for antibiotics when they do not think you need them.    If you are prescribed antibiotics, finish all of the medicine and take it exactly as directed. Never skip doses or stop taking the medicine without talking to your doctor or nurse.    Do not give antibiotics that were prescribed to you to anyone else.    What if my doctor prescribes an antibiotic that did not work for me before?  If an antibiotic did not work for you before, that does not mean it will never work for you. If you have used an " "antibiotic before and it did not work, tell your doctor. But keep in mind that the infection you had before might not have been caused by the same bacteria that you have now. The \"best\" antibiotic is the right one for the bacteria causing the infection, not for the person with the infection.    What if I am allergic to an antibiotic?  If you had a bad reaction to an antibiotic, tell your doctor or nurse about it. But do not assume you are allergic unless your doctor or nurse tells you that you are.    Many people who think they are allergic to an antibiotic are wrong. If you get nauseous after taking an antibiotic, that does not mean you are allergic to it. Nausea is a common side effect of many antibiotics. If you are a woman and you get a yeast infection after taking an antibiotic, that does not mean you are allergic to it. Yeast infections are a common side effect of antibiotics.    Symptoms of antibiotic allergy can be mild and include a flat rash and itching. They can also be more serious and include:    Hives - Hives are raised, red patches of skin that are usually very itchy (picture 1).    Lip or tongue swelling    Trouble swallowing or breathing    Serious allergy symptoms can start right after you start taking an antibiotic if you are very sensitive. Less serious symptoms, on the other hand, often start a day or more later.    Almost all antibiotics may cause possible side effects of allergic reaction, nausea, vomiting, diarrhea- most worrisome caused by C. diff, and secondary yeast infection.                                          Assessment/Plan        Assessment/Plan        Assessment & Plan  Otalgia, unspecified laterality         Pharyngitis, unspecified etiology         Acute otitis media, unspecified otitis media type         Other acute sinusitis, recurrence not specified         Left ear pain         Abnormal tympanic membrane of left ear  Possible chloesteotoma       Perforation of right " tympanic membrane           Off work today             Call if no better or if symptoms worsen    Follow up Dr Tarango elevated BP     Time spent during the office visit includes-    updating and familiarizing myself with patients past medical history, social history, and allergies :  discussing ROS ;  obtaining direct  chief complaint and history of present illness from patient ,  reviewing and reconciling current medication list - both prescription and over the counter medications    clinically examine patient    determine what testing if any is needed to  diagnose the condition/conditions  with which patient is presenting    using medical knowledge to put all of the information together and decide on best course of action to proceed with diagnosis  and  treatment for the presenting problem or problems    Pre-visit planning, chart review, and face-to-face encounter   Discussion of medications  Coordination with office staff for med adjustments   Final documentation of visit          My total time spent caring for the patient for the day of the encounter (before,during, and after) was =          total minutes.    (Prep+during visit+post visit)          [1]   Social History  Tobacco Use   Smoking Status Never    Passive exposure: Never   Smokeless Tobacco Never

## 2025-08-20 ENCOUNTER — OFFICE VISIT (OUTPATIENT)
Dept: PRIMARY CARE | Facility: CLINIC | Age: 45
End: 2025-08-20
Payer: COMMERCIAL

## 2025-08-20 VITALS
SYSTOLIC BLOOD PRESSURE: 170 MMHG | OXYGEN SATURATION: 98 % | DIASTOLIC BLOOD PRESSURE: 84 MMHG | HEIGHT: 74 IN | TEMPERATURE: 98.3 F | HEART RATE: 72 BPM | BODY MASS INDEX: 34.91 KG/M2 | WEIGHT: 272 LBS | RESPIRATION RATE: 12 BRPM

## 2025-08-20 DIAGNOSIS — E66.09 CLASS 2 OBESITY DUE TO EXCESS CALORIES WITH BODY MASS INDEX (BMI) OF 35.0 TO 35.9 IN ADULT, UNSPECIFIED WHETHER SERIOUS COMORBIDITY PRESENT: Primary | ICD-10-CM

## 2025-08-20 DIAGNOSIS — E66.812 CLASS 2 OBESITY DUE TO EXCESS CALORIES WITH BODY MASS INDEX (BMI) OF 35.0 TO 35.9 IN ADULT, UNSPECIFIED WHETHER SERIOUS COMORBIDITY PRESENT: Primary | ICD-10-CM

## 2025-08-20 DIAGNOSIS — R40.0 DAYTIME SLEEPINESS: ICD-10-CM

## 2025-08-20 DIAGNOSIS — R06.83 SNORING: ICD-10-CM

## 2025-08-20 DIAGNOSIS — R06.89 GASPING FOR BREATH: ICD-10-CM

## 2025-08-20 DIAGNOSIS — G47.33 OSA TREATED WITH BIPAP: ICD-10-CM

## 2025-08-20 DIAGNOSIS — R53.82 CHRONIC FATIGUE: ICD-10-CM

## 2025-08-20 PROCEDURE — 3077F SYST BP >= 140 MM HG: CPT | Performed by: FAMILY MEDICINE

## 2025-08-20 PROCEDURE — 99214 OFFICE O/P EST MOD 30 MIN: CPT | Performed by: FAMILY MEDICINE

## 2025-08-20 PROCEDURE — 1036F TOBACCO NON-USER: CPT | Performed by: FAMILY MEDICINE

## 2025-08-20 PROCEDURE — 3079F DIAST BP 80-89 MM HG: CPT | Performed by: FAMILY MEDICINE

## 2025-08-20 PROCEDURE — 3008F BODY MASS INDEX DOCD: CPT | Performed by: FAMILY MEDICINE

## 2025-10-29 ENCOUNTER — APPOINTMENT (OUTPATIENT)
Dept: PRIMARY CARE | Facility: CLINIC | Age: 45
End: 2025-10-29
Payer: COMMERCIAL

## (undated) DEVICE — DRIVER, NEEDLE, MEGA SUTURE CUT, DAVINCI XI

## (undated) DEVICE — RELOAD, SUREFORM STAPLER 60, 2.5 WHITE, DAVINCI XI

## (undated) DEVICE — RESERVOIR, DRAINAGE, WOUND, JACKSON-PRATT, 100 CC, SILICONE

## (undated) DEVICE — DRAIN, PENROSE, 0.25 X 18 IN, LATEX, STERILE

## (undated) DEVICE — NEEDLE, EPIDURAL 17G X 4 1/2 PERIFIX

## (undated) DEVICE — DRAPE, ARM XI

## (undated) DEVICE — CANNULA SEAL, STAPLER, DAVINCI XI

## (undated) DEVICE — APPLICATOR, CHLORAPREP, W/ORANGE TINT, 26ML

## (undated) DEVICE — KIT, LAP GASTRIC BYPASS, CUSTOM, PARMA

## (undated) DEVICE — DRAIN, CHANNEL, ROUND, FULL FLUTE 19F

## (undated) DEVICE — SLEEVE, KII, Z-THREAD, 5X100CM

## (undated) DEVICE — OBTURATOR, BLADELESS , SU

## (undated) DEVICE — TROCAR, OPTICAL, BLADELESS, KII FIOS, 5 X 100MM, THREADED

## (undated) DEVICE — DRESSING, DRAIN SPONGE, EXCILON AMD, 4X4 IN, 6 PLY, ST

## (undated) DEVICE — MAT, AIR TRANSFER, 39X81

## (undated) DEVICE — GRASPER, FENESTRATED TIP-UP XI

## (undated) DEVICE — TROCAR, KII OPTICAL BLADELESS 5MM Z THREAD 100MM LNGTH

## (undated) DEVICE — LIGASURE, L-HOOK 44CM SEALER/DIVIDER LAP, MARYLAND

## (undated) DEVICE — SYRINGE, 20 CC, LUER SLIP

## (undated) DEVICE — LAVAGE KIT, GASTRIC, EDLICH, 34 FR, 36 IN

## (undated) DEVICE — Device

## (undated) DEVICE — NEEDLE, HYPODERMIC, SAFETYGLIDE, SHIELDING, REGULAR WALL, REGULAR BEVEL, 22 G X 1.5 IN, BLACK HUB

## (undated) DEVICE — SCISSORS, MONOPOLAR, CURVED, 8MM

## (undated) DEVICE — SYRINGE, 30 CC, LUER LOCK

## (undated) DEVICE — RELOAD, ENDOSTITCH, POLYSORB, 2-0, 48 IN, ES9, VIOLET, SULU

## (undated) DEVICE — CARE KIT, LAPAROSCOPIC, ADVANCED

## (undated) DEVICE — TROCAR SYSTEM, BALLOON, KII GELPORT, 12 X 130MM

## (undated) DEVICE — CANNULA REDUCER, DAVINCI XI

## (undated) DEVICE — BINDER, ABDOMINAL, 4 PANEL, 12 X 63-74 IN

## (undated) DEVICE — SLEEVE, KII, Z-THREAD, 12X100CM

## (undated) DEVICE — NEEDLE, CLOSURE, OMNICLOSE

## (undated) DEVICE — SYRINGE, 30 CC, LUER SLIP TIP

## (undated) DEVICE — PROTECTOR, HEEL/ANKLE/ELBOW, UNIVERSAL

## (undated) DEVICE — TUBING SET, BIFURCATED, SMOKE EVAC, FILTERED, F/AIRSEAL

## (undated) DEVICE — TROCAR, OPTICAL, BLADELESS, 12MM, THREADED, 100MM LENGTH

## (undated) DEVICE — DRAPE, COLUMN, DAVINCI XI

## (undated) DEVICE — FORCEPS, CADIERE, DAVINCI XI

## (undated) DEVICE — DRAPE, SHEET, THREE QUARTER, FAN FOLD, 57 X 77 IN

## (undated) DEVICE — MARKER, SKIN, REGULAR TIP, W/FLEXI-RULER

## (undated) DEVICE — SEAL, UNIVERSAL 5-8MM  XI

## (undated) DEVICE — APPLICATOR, DUPLOTIP 318

## (undated) DEVICE — RELOAD, ENDOSTITCH, SURGIDAC, 2-0, 48 IN, GREEN, SULU

## (undated) DEVICE — STAPLER,  LINEAR RELOAD, 60MM, WHITE, DISP

## (undated) DEVICE — SEALER, VESSEL, EXTENDED

## (undated) DEVICE — STAPLER, SUREFORM 60, DAVINCI XI

## (undated) DEVICE — SLEEVE, VASO PRESS, CALF GARMENT, LARGE, GREEN

## (undated) DEVICE — COVER, TIP HOT SHEARS ENDOWRIST

## (undated) DEVICE — STAPLER, ECHELON 3000, 60MM LONG

## (undated) DEVICE — TISSEEL FIBRIN SEALANT, PRIMA, FROZEN, 10ML

## (undated) DEVICE — LUBRICANT, WATER SOLUBLE, BACTERIOSTATIC, 2 OZ, STERILE